# Patient Record
Sex: FEMALE | Race: WHITE | NOT HISPANIC OR LATINO | Employment: PART TIME | URBAN - NONMETROPOLITAN AREA
[De-identification: names, ages, dates, MRNs, and addresses within clinical notes are randomized per-mention and may not be internally consistent; named-entity substitution may affect disease eponyms.]

---
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
## 2022-01-05 ENCOUNTER — NURSE TRIAGE (OUTPATIENT)
Dept: PEDIATRICS | Facility: OTHER | Age: 22
End: 2022-01-05

## 2022-01-05 NOTE — TELEPHONE ENCOUNTER
Spoke with patient. Denies abdominal pain, nausea has improved slightly. Able to keep down fluids. Is voiding, denies diarrhea. She still has some Zofran ODT from her last bout of gastritis; she took one around 3 am and it helped slightly.    Most likely a 24-hour stomach bug, as that is going around. Unlikely to be an acute appendicitis as no abdominal pain. Recommend home care, with small frequent sips of fluid and Zofran as needed. Follow up if still having symptoms tomorrow.    Marimar is in agreement with the plan. Requested note to excuse from work - letter written and sent via [employer].

## 2022-01-05 NOTE — LETTER
January 5, 2022      Marimar Jarrett  [address] [facility] [address]        To Whom It May Concern:    Marimar Jarrett  was seen virtually on 1/5/22.  Please excuse her from work until symptoms improve due to illness. I expect she should be able to return to work on 1/6/22 or 1/7/22.        Sincerely,        KENDRICK Oakes CNP

## 2022-01-05 NOTE — TELEPHONE ENCOUNTER
Call from patient reporting vomiting and fever (temp 102). Patient report drinking a lot of water, feeling dizziness since 1/4/22 around the same time vomiting started.     Patient reports 6 episodes of vomiting since 11 pm-930 am on 1/4/22. Denies fatigue, weakness or unsteady on feet.     Exposure to covid 19 on 1/4/22 to a friend testing positive for covid 19 on 1/5/22.    Patient has been fully vaccinated for covid 19. Has not received booster.     Patient is requesting appointment with Keri Kemp, offered covid testing, patient refused as she is wanting to see Keri Kemp. Patient states that work can test for covid 19.     Please reach out to patient at [phone]        Reason for Disposition    COVID-19 Home Isolation, questions about    Additional Information    Negative: SEVERE difficulty breathing (e.g., struggling for each breath, speaks in single words)    Negative: Difficult to awaken or acting confused (e.g., disoriented, slurred speech)    Negative: Bluish (or gray) lips or face now    Negative: Shock suspected (e.g., cold/pale/clammy skin, too weak to stand, low BP, rapid pulse)    Negative: Sounds like a life-threatening emergency to the triager    Negative: [1] COVID-19 exposure AND [2] no symptoms    Negative: COVID-19 vaccine reaction suspected (e.g., fever, headache, muscle aches) occurring 1 to 3 days after getting vaccine    Negative: COVID-19 vaccine, questions about    Negative: [1] Lives with someone known to have influenza (flu test positive) AND [2] flu-like symptoms (e.g., cough, runny nose, sore throat, SOB; with or without fever)    Negative: [1] Adult with possible COVID-19 symptoms AND [2] triager concerned about severity of symptoms or other causes    Negative: COVID-19 and breastfeeding, questions about    Negative: SEVERE or constant chest pain or pressure (Exception: mild central chest pain, present only when coughing)    Negative: MODERATE difficulty breathing (e.g.,  speaks in phrases, SOB even at rest, pulse 100-120)    Negative: [1] Headache AND [2] stiff neck (can't touch chin to chest)    Negative: MILD difficulty breathing (e.g., minimal/no SOB at rest, SOB with walking, pulse <100)    Negative: Chest pain or pressure    Negative: Patient sounds very sick or weak to the triager    Negative: Fever > 103 F (39.4 C)    Negative: [1] Fever > 101 F (38.3 C) AND [2] age > 60 years    Negative: [1] Fever > 100.0 F (37.8 C) AND [2] bedridden (e.g., nursing home patient, CVA, chronic illness, recovering from surgery)    Negative: HIGH RISK for severe COVID complications (e.g., age > 64 years, obesity with BMI > 25, pregnant, chronic lung disease or other chronic medical condition)  (Exception: Already seen by PCP and no new or worsening symptoms.)    Negative: [1] HIGH RISK patient AND [2] influenza is widespread in the community AND [3] ONE OR MORE respiratory symptoms: cough, sore throat, runny or stuffy nose    Negative: [1] HIGH RISK patient AND [2] influenza exposure within the last 7 days AND [3] ONE OR MORE respiratory symptoms: cough, sore throat, runny or stuffy nose    Negative: [1] COVID-19 infection suspected by caller or triager AND [2] mild symptoms (cough, fever, or others) AND [3] negative COVID-19 rapid test    Negative: Fever present > 3 days (72 hours)    Negative: [1] Fever returns after gone for over 24 hours AND [2] symptoms worse or not improved    Negative: [1] Continuous (nonstop) coughing interferes with work or school AND [2] no improvement using cough treatment per protocol    Negative: Cough present > 3 weeks    Negative: [1] COVID-19 diagnosed by positive lab test AND [2] NO symptoms (e.g., cough, fever, others)    Negative: [1] COVID-19 diagnosed by positive lab test AND [2] mild symptoms (e.g., cough, fever, others) AND [3] no complications or SOB    Negative: [1] COVID-19 diagnosed by doctor (or NP/PA) AND [2] mild symptoms (e.g., cough, fever,  "others) AND [3] no complications or SOB    Negative: [1] COVID-19 diagnosed AND [2] has mild nausea, vomiting or diarrhea    Answer Assessment - Initial Assessment Questions  1. COVID-19 DIAGNOSIS: \"Who made your Coronavirus (COVID-19) diagnosis?\" \"Was it confirmed by a positive lab test?\" If not diagnosed by a HCP, ask \"Are there lots of cases (community spread) where you live?\" (See public health department website, if unsure)      No     2. COVID-19 EXPOSURE: \"Was there any known exposure to COVID before the symptoms began?\" [facility] Definition of close contact: within 6 feet (2 meters) for a total of 15 minutes or more over a 24-hour period.       Yes, exposure to a friend on 1/4/22 who tested positive on 1/5/22    3. ONSET: \"When did the COVID-19 symptoms start?\"       1/4/22 around 11 pm     4. WORST SYMPTOM: \"What is your worst symptom?\" (e.g., cough, fever, shortness of breath, muscle aches)      Vomiting     5. COUGH: \"Do you have a cough?\" If Yes, ask: \"How bad is the cough?\"        No     6. FEVER: \"Do you have a fever?\" If Yes, ask: \"What is your temperature, how was it measured, and when did it start?\"      Yes temp 102    7. RESPIRATORY STATUS: \"Describe your breathing?\" (e.g., shortness of breath, wheezing, unable to speak)       No     8. BETTER-SAME-WORSE: \"Are you getting better, staying the same or getting worse compared to yesterday?\"  If getting worse, ask, \"In what way?\"      Better    9. HIGH RISK DISEASE: \"Do you have any chronic medical problems?\" (e.g., asthma, heart or lung disease, weak immune system, obesity, etc.)      No     10. PREGNANCY: \"Is there any chance you are pregnant?\" \"When was your last menstrual period?\"        No     11. OTHER SYMPTOMS: \"Do you have any other symptoms?\"  (e.g., chills, fatigue, headache, loss of smell or taste, muscle pain, sore throat; new loss of smell or taste especially support the diagnosis of COVID-19)        Vomiting, fever and dizziness    Protocols " used: CORONAVIRUS (COVID-19) DIAGNOSED OR [id number]-BV 8.25.2021

## 2022-01-23 ENCOUNTER — HEALTH MAINTENANCE LETTER (OUTPATIENT)
Age: 22
End: 2022-01-23

## 2022-01-26 ENCOUNTER — OFFICE VISIT (OUTPATIENT)
Dept: PEDIATRICS | Facility: OTHER | Age: 22
End: 2022-01-26
Attending: NURSE PRACTITIONER
Payer: COMMERCIAL

## 2022-01-26 ENCOUNTER — MYC MEDICAL ADVICE (OUTPATIENT)
Dept: PEDIATRICS | Facility: OTHER | Age: 22
End: 2022-01-26

## 2022-01-26 VITALS
OXYGEN SATURATION: 99 % | BODY MASS INDEX: 23.23 KG/M2 | DIASTOLIC BLOOD PRESSURE: 68 MMHG | HEART RATE: 82 BPM | SYSTOLIC BLOOD PRESSURE: 102 MMHG | RESPIRATION RATE: 18 BRPM | TEMPERATURE: 97 F | WEIGHT: 115 LBS

## 2022-01-26 DIAGNOSIS — S06.0X9A CONCUSSION WITH LOSS OF CONSCIOUSNESS, INITIAL ENCOUNTER: ICD-10-CM

## 2022-01-26 DIAGNOSIS — R55 SYNCOPE, UNSPECIFIED SYNCOPE TYPE: Primary | ICD-10-CM

## 2022-01-26 LAB — HCG UR QL: NEGATIVE

## 2022-01-26 PROCEDURE — 99214 OFFICE O/P EST MOD 30 MIN: CPT | Performed by: NURSE PRACTITIONER

## 2022-01-26 PROCEDURE — G0463 HOSPITAL OUTPT CLINIC VISIT: HCPCS

## 2022-01-26 PROCEDURE — 81025 URINE PREGNANCY TEST: CPT | Mod: ZL | Performed by: NURSE PRACTITIONER

## 2022-01-26 ASSESSMENT — PAIN SCALES - GENERAL: PAINLEVEL: SEVERE PAIN (7)

## 2022-01-26 NOTE — NURSING NOTE
"Chief Complaint   Patient presents with     Fall       Initial /68   Pulse 82   Temp 97  F (36.1  C)   Resp 18   Wt 52.2 kg (115 lb)   LMP 01/09/2022 (Approximate)   SpO2 99%   BMI 23.23 kg/m   Estimated body mass index is 23.23 kg/m  as calculated from the following:    Height as of 10/8/21: 1.499 m (4' 11\").    Weight as of this encounter: 52.2 kg (115 lb).  Medication Reconciliation: lavon Garcia  "

## 2022-01-26 NOTE — LETTER
January 26, 2022      Marimar Jarrett  [address] [place] [address]        To Whom It May Concern:    Marimar Jarrett  was seen on 1/26/22.  Please excuse her from work until Monday 1/31/22 due to injury.        Sincerely,        KENDRICK Oakes CNP

## 2022-01-26 NOTE — PROGRESS NOTES
"  Assessment & Plan     Syncope, unspecified syncope type  Symptoms are typical of vasovagal syncope. Urine pregnancy test is negative - may restart oral contraceptives today.     Recommend adequate fluid intake with a goal of 2-3 L daily. Eat foods with adequate salt to maintain blood pressure. Change positions slowly. Sit down with head lower than heart if having a future episode of flushing or tunnel vision.  - HCG qualitative urine    Concussion with loss of consciousness, initial encounter  Neuro exam is normal. LOC due to syncope. Recommend staying home from work tomorrow, and return to normal activity over the next few days. Avoid activity that has a risk of further head injury. Use ice or cool washcloth to forehead for comfort. May take acetaminophen or ibuprofen if helpful, although ibuprofen may increase any bruising that may develop.    Headache should be improving over the next few days. Follow up in about 1 week to follow up concussion. Return sooner if worsening symptoms.        35 minutes spent on the date of the encounter doing chart review, history and exam, documentation and further activities per the note           Return in about 1 week (around 2/2/2022).    KENDRICK Oakes [facility] - ROCIO Minaya is a 22 year old who presents for the following health issues     HPI     Concern - Headache - fell this morning at 0445. Was getting up, getting ready for work, about to brush her teeth, when she felt hot and then her vision went dark and she fell. She did have LOC, but unsure how long. Possibly hit her head when she fell, but uncertain.  Was fully aware right after. No loss of bowel or bladder. Boyfriend found her lying on her right side  -Patient discontinued all medications \"about a month ago\" (oral contraceptives, LEXAPRO, VALTREX. She is unsure why she stopped taking it.  -Patient states that she last ate at 1900 the night prior, no personal or " family Hx of loss of consciousness. She has been eating and drinking today. Denies nausea.  - Sister had a seizure when she was 25 years old       Onset: 1/26/2021 0445     Description:   Frontal lobe headache 7/10    Intensity: moderate, 7/10    Progression of Symptoms:  same    Accompanying Signs & Symptoms:  None    Previous history of similar problem:   None    Precipitating factors:   Worsened by: None    Alleviating factors:  Improved by: None    Therapies Tried and outcome: Tylenol - ineffective     H/A is her only current symptom.      LMP 1/9/22, which was normal, but short (3-4 days). Stopped oral contraceptives shortly after Christmas 2021.      Review of Systems   Constitutional, HEENT, cardiovascular, pulmonary, gi and gu systems are negative, except as otherwise noted.      Objective    /68   Pulse 82   Temp 97  F (36.1  C)   Resp 18   Wt 52.2 kg (115 lb)   LMP 01/09/2022 (Approximate)   SpO2 99%   BMI 23.23 kg/m    Body mass index is 23.23 kg/m .  Physical Exam   GENERAL: healthy, alert and no distress  EYES: Eyes grossly normal to inspection, PERRL and conjunctivae and sclerae normal  HENT: ear canals and TM's normal, nose and mouth without ulcers or lesions. Forehead with   NECK: no adenopathy, no asymmetry, masses, or scars and thyroid normal to palpation  RESP: lungs clear to auscultation - no rales, rhonchi or wheezes  CV: regular rate and rhythm, normal S1 S2, no S3 or S4, no murmur, click or rub, no peripheral edema and peripheral pulses strong  MS: no gross musculoskeletal defects noted, no edema  SKIN: no suspicious lesions or rashes  NEURO: Normal strength and tone, mentation intact and speech normal  PSYCH: mentation appears normal, affect slightly flat    Results for orders placed or performed in visit on 01/26/22 (from the past 24 hour(s))   HCG qualitative urine   Result Value Ref Range    hCG Urine Qualitative Negative Negative

## 2022-02-15 NOTE — PROGRESS NOTES
Assessment & Plan     Other depression  Discussed restarting Lexapro (as it had not fully taken effect prior to stopping) vs starting an alternate therapy. Marimar elects to try Lexapro again, which is reasonable. Will take 1/2 tablet for the first week, then increase to a full tablet daily. Follow up in 3 weeks.  - escitalopram (LEXAPRO) 20 MG tablet; Take 1 tablet (20 mg) by mouth daily    Anxiety    - escitalopram (LEXAPRO) 20 MG tablet; Take 1 tablet (20 mg) by mouth daily    Concussion with loss of consciousness, subsequent encounter  Continued headaches, but denies other symptoms. Difficult to tell whether headaches are caused by stress or residual from concussion. Will recheck at follow up visit.    Recurrent cold sores  Refilled valacyclovir  - valACYclovir (VALTREX) 500 MG tablet; Take 1 tablet (500 mg) by mouth daily    Encounter for surveillance of contraceptive pills  Urine pregnancy negative today. May restart oral contraceptives.  - HCG qualitative urine      34 minutes spent on the date of the encounter doing chart review, history and exam, documentation and further activities per the note           Return in about 3 weeks (around 3/14/2022) for Medication follow up.    KENDRICK Oakes [facility] - HIBANN MARIE    Subjective   Marimar is a 22 year old who presents for the following health issues     HPI     Depression and Anxiety Follow-Up    How are you doing with your depression since your last visit? No change    How are you doing with your anxiety since your last visit?  No change    Are you having other symptoms that might be associated with depression or anxiety? Yes:  see phq, sam    Have you had a significant life event? No  - concussion 1/26/22 after a syncopal episode. No further syncope    Do you have any concerns with your use of alcohol or other drugs? No     Stopped Lexapro 2 months ago, about 3 weeks after dose was increased to 20 mg daily. Unsure why she  stopped.    Will be starting a new job at [employer] in [place] next week - is excited to start.     Still having occasional headaches after concussion. The other day, took 1000 mg of Tylenol without relief. Going to bed helps. She was not getting headaches prior. 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    Wondering about trying a different depression/anxiety med. She did notice the Lexapro helped with some of her fear, but didn't feel it was overall helpful.    Is not in therapy. She had previously seen someone at [facility], but stopped going sometime last fall. She is interested in trying therapy somewhere else.    She has not restarted her birth control, as it wasn't ready at the pharmacy when she went to pick it up. No periods for the last 3 months or so.    Lives with her boyfriend. States it's going well most of the time.    Exercise helps her symptoms of depression/anxiety.    No caffeine use      Social History     Tobacco Use     Smoking status: Never Smoker     Smokeless tobacco: Never Used   Vaping Use     Vaping Use: Never used   Substance Use Topics     Alcohol use: No     Drug use: No     PHQ 9/13/2021 10/8/2021 2/21/2022   PHQ-9 Total Score 15 22 24   Q9: Thoughts of better off dead/self-harm past 2 weeks Not at all Not at all Not at all     ANAYA-7 SCORE 9/13/2021 10/8/2021 2/21/2022   Total Score 20 21 21     Last PHQ-9 2/21/2022   1.  Little interest or pleasure in doing things 3   2.  Feeling down, depressed, or hopeless 3   3.  Trouble falling or staying asleep, or sleeping too much 3   4.  Feeling tired or having little energy 3   5.  Poor appetite or overeating 3   6.  Feeling bad about yourself 3   7.  Trouble concentrating 3   8.  Moving slowly or restless 3   Q9: Thoughts of better off dead/self-harm past 2 weeks 0   PHQ-9 Total Score 24   Difficulty at work, home, or with people Not difficult at all      ANAYA-7  2/21/2022   1. Feeling nervous, anxious, or on edge 3   2. Not being able to stop or control worrying 3   3. Worrying too much about different things 3   4. Trouble relaxing 3   5. Being so restless that it is hard to sit still 3   6. Becoming easily annoyed or irritable 3   7. Feeling afraid, as if something awful might happen 3   ANAYA-7 Total Score 21   If you checked any problems, how difficult have they made it for you to do your work, take care of things at home, or get along with other people? Not difficult at all       Suicide Assessment Five-step Evaluation and Treatment (SAFE-T)          Review of Systems   Constitutional, HEENT, cardiovascular, pulmonary, gi and gu systems are negative, except as otherwise noted.      Objective    /62 (BP Location: Right arm, Patient Position: Chair, Cuff Size: Adult Regular)   Pulse 78   Temp 96.8  F (36  C) (Tympanic)   Resp 20   Ht 1.524 m (5')   Wt 49.9 kg (110 lb)   SpO2 100%   BMI 21.48 kg/m    Body mass index is 21.48 kg/m .  Physical Exam   GENERAL: healthy, alert and no distress  RESP: lungs clear to auscultation - no rales, rhonchi or wheezes  CV: regular rate and rhythm, normal S1 S2, no S3 or S4, no murmur, click or rub, no peripheral edema and peripheral pulses strong      DIAGNOSTICS:  Results for orders placed or performed in visit on 02/21/22   HCG qualitative urine     Status: Normal   Result Value Ref Range    hCG Urine Qualitative Negative Negative

## 2022-02-21 ENCOUNTER — OFFICE VISIT (OUTPATIENT)
Dept: PEDIATRICS | Facility: OTHER | Age: 22
End: 2022-02-21
Attending: NURSE PRACTITIONER
Payer: COMMERCIAL

## 2022-02-21 VITALS
WEIGHT: 110 LBS | HEIGHT: 60 IN | DIASTOLIC BLOOD PRESSURE: 62 MMHG | OXYGEN SATURATION: 100 % | BODY MASS INDEX: 21.6 KG/M2 | HEART RATE: 78 BPM | TEMPERATURE: 96.8 F | SYSTOLIC BLOOD PRESSURE: 106 MMHG | RESPIRATION RATE: 20 BRPM

## 2022-02-21 DIAGNOSIS — B00.1 RECURRENT COLD SORES: ICD-10-CM

## 2022-02-21 DIAGNOSIS — F41.9 ANXIETY: ICD-10-CM

## 2022-02-21 DIAGNOSIS — F32.89 OTHER DEPRESSION: Primary | ICD-10-CM

## 2022-02-21 DIAGNOSIS — Z30.41 ENCOUNTER FOR SURVEILLANCE OF CONTRACEPTIVE PILLS: ICD-10-CM

## 2022-02-21 DIAGNOSIS — S06.0X9D CONCUSSION WITH LOSS OF CONSCIOUSNESS, SUBSEQUENT ENCOUNTER: ICD-10-CM

## 2022-02-21 LAB — HCG UR QL: NEGATIVE

## 2022-02-21 PROCEDURE — 99214 OFFICE O/P EST MOD 30 MIN: CPT | Performed by: NURSE PRACTITIONER

## 2022-02-21 PROCEDURE — G0463 HOSPITAL OUTPT CLINIC VISIT: HCPCS

## 2022-02-21 PROCEDURE — 81025 URINE PREGNANCY TEST: CPT | Mod: ZL | Performed by: NURSE PRACTITIONER

## 2022-02-21 RX ORDER — VALACYCLOVIR HYDROCHLORIDE 500 MG/1
500 TABLET, FILM COATED ORAL DAILY
Qty: 30 TABLET | Refills: 11 | Status: SHIPPED | OUTPATIENT
Start: 2022-02-21 | End: 2022-06-29

## 2022-02-21 RX ORDER — ESCITALOPRAM OXALATE 20 MG/1
20 TABLET ORAL DAILY
Qty: 30 TABLET | Refills: 0 | Status: SHIPPED | OUTPATIENT
Start: 2022-02-21 | End: 2022-05-03

## 2022-02-21 ASSESSMENT — PATIENT HEALTH QUESTIONNAIRE - PHQ9: SUM OF ALL RESPONSES TO PHQ QUESTIONS 1-9: 24

## 2022-02-21 ASSESSMENT — ANXIETY QUESTIONNAIRES
GAD7 TOTAL SCORE: 21
7. FEELING AFRAID AS IF SOMETHING AWFUL MIGHT HAPPEN: NEARLY EVERY DAY
6. BECOMING EASILY ANNOYED OR IRRITABLE: NEARLY EVERY DAY
1. FEELING NERVOUS, ANXIOUS, OR ON EDGE: NEARLY EVERY DAY
4. TROUBLE RELAXING: NEARLY EVERY DAY
IF YOU CHECKED OFF ANY PROBLEMS ON THIS QUESTIONNAIRE, HOW DIFFICULT HAVE THESE PROBLEMS MADE IT FOR YOU TO DO YOUR WORK, TAKE CARE OF THINGS AT HOME, OR GET ALONG WITH OTHER PEOPLE: NOT DIFFICULT AT ALL
2. NOT BEING ABLE TO STOP OR CONTROL WORRYING: NEARLY EVERY DAY
3. WORRYING TOO MUCH ABOUT DIFFERENT THINGS: NEARLY EVERY DAY
5. BEING SO RESTLESS THAT IT IS HARD TO SIT STILL: NEARLY EVERY DAY

## 2022-02-21 ASSESSMENT — PAIN SCALES - GENERAL: PAINLEVEL: NO PAIN (0)

## 2022-02-21 NOTE — NURSING NOTE
Chief Complaint   Patient presents with     Depression     Anxiety       Initial /62 (BP Location: Right arm, Patient Position: Chair, Cuff Size: Adult Regular)   Pulse 78   Temp 96.8  F (36  C) (Tympanic)   Resp 20   Ht 1.524 m (5')   Wt 49.9 kg (110 lb)   SpO2 100%   BMI 21.48 kg/m   Estimated body mass index is 21.48 kg/m  as calculated from the following:    Height as of this encounter: 1.524 m (5').    Weight as of this encounter: 49.9 kg (110 lb).  Medication Reconciliation: complete  Mary Anne LPN

## 2022-02-21 NOTE — PATIENT INSTRUCTIONS
Psychologists/ Counselors      Jewel Garcia   [phone]  [facility]   [phone]  [facility] [phone]  [facility] (kids) [phone]  [facility](teens) [phone]  [facility]  [phone]  [facility]  [phone]  [facility]  [phone]  [facility]  [phone]  [facility] counseling [phone]   [facility]   [phone]   [facility]    [phone]   [facility] [place]   [phone]   (Randi Villatoro)  [facility]                          [phone]  (IZAIAH Rodriguez)                                                      [facility]                      [phone]           [facility]  [phone]  [facility] [phone]  [facility]  [phone]  [facility]  [phone]     [facility] [phone]      [facility] [phone]  [facility] counseling [phone]  [facility]   [phone]  [facility] (IZAIAH Rodriguez)                                                   [phone]  [facility]  [phone]  [facility] [phone]  [facility]      [phone]    [facility]  [phone]  [facility]      [phone]     [facility]   [phone]  [facility]      [phone]  [facility] [phone]  [facility]      [phone]  [facility]  [phone]    [facility]                      [phone]    *Facilities in bold italics indicate medication management  Services are offered.    Crisis support  Mobile crisis line- [phone]  [facility]: Free online resources including therapy for Mental Health and substance use problems: Http://thriverange.org    Crisis Text Line  Text HOME to 577-971 - The Crisis  Text Line serves anyone, in any type of crisis, providing access to free, 24/7 support and information via the medium people already use and trust  http://www.crisistextline.org   Here's how it works:  Text HOME to 225-070 from anywhere in the [place], anytime, about any type of crisis.  A live, trained Crisis Counselor receives the text and responds quickly.  The volunteer Crisis Counselor will help you move from a 'hot moment to a cool moment'

## 2022-02-22 ASSESSMENT — ANXIETY QUESTIONNAIRES: GAD7 TOTAL SCORE: 21

## 2022-05-03 ENCOUNTER — ALLIED HEALTH/NURSE VISIT (OUTPATIENT)
Dept: ALLERGY | Facility: OTHER | Age: 22
End: 2022-05-03
Attending: NURSE PRACTITIONER
Payer: COMMERCIAL

## 2022-05-03 DIAGNOSIS — Z11.1 SCREENING EXAMINATION FOR PULMONARY TUBERCULOSIS: Primary | ICD-10-CM

## 2022-05-03 PROCEDURE — 86580 TB INTRADERMAL TEST: CPT

## 2022-05-03 NOTE — PROGRESS NOTES
Mantoux administered intradermally left Fore Arm.  Pt advised to RTC in 48-72 hrs to have it read.    Shun Goss RN on 5/3/2022 at 1:09 PM

## 2022-06-14 NOTE — PROGRESS NOTES
Assessment & Plan     Anxiety  Marimar has been taking Lexapro consistently for about 3-4 weeks. Will continue and recheck in about 3 weeks. Recommend a psychiatric medication provider, as symptoms are more complex than simple depression/anxiety and are concerning for potential bipolar, ADHD, or other complicating factor. Marimar is agreeable to the idea. Referral sent to [facility] in [place]. Recommended contacting them this week to start the intake process.  - CBC with platelets  - Ferritin  - TSH with free T4 reflex  - Vitamin D Deficiency  - escitalopram (LEXAPRO) 20 MG tablet; Take 1 tablet (20 mg) by mouth daily  - Adult Mental Health  Referral; Future    Other depression    - CBC with platelets  - Ferritin  - TSH with free T4 reflex  - Vitamin D Deficiency  - escitalopram (LEXAPRO) 20 MG tablet; Take 1 tablet (20 mg) by mouth daily  - Adult Mental Health  Referral; Future    Hair loss  TSH is normal, but ferritin is quite low. Start iron, and will recheck at follow up visit. Await vitamin D testing.  - CBC with platelets  - Ferritin  - TSH with free T4 reflex  - Vitamin D Deficiency    Low serum ferritin level    - ferrous sulfate (FEROSUL) 325 (65 Fe) MG tablet; Take 1 tablet (325 mg) by mouth daily (with breakfast) Avoid taking with dairy      32 minutes spent on the date of the encounter doing chart review, history and exam, documentation and further activities per the note           Return in about 22 days (around 7/7/2022) for Medication follow up.    KENDRICK Oakes [facility]   Marimar is a 22 year old who presents for the following health issues     HPI     Depression and Anxiety Follow-Up    How are you doing with your depression since your last visit? No change    How are you doing with your anxiety since your last visit?  No change- states before 10 now 8- doesn't feel as if someone is watching her all the time     Are you  having other symptoms that might be associated with depression or anxiety? No    Have you had a significant life event? No     Do you have any concerns with your use of alcohol or other drugs? No     Difficulty taking medication (has had 60 doses in the last 4 months - 120 days)? Started taking the medication regularly about a month ago (when last prescribed).     Not currently in counseling, but has been talking to her mom a lot lately, which has been helpful.    Feels that her mind is constantly racing. Has talked with her mom about the possibility of ADHD and/or bipolar symptoms. Short-tempered. She has had periods during the year (mainly in the winter) when she didn't want to leave her room and spent the majority of her day on her phone.    Has been losing hair more than normal for the past 3 months. States her  has even noticed the hair loss. Not due to breakage. Unable to sleep well, dreams have been very vivid lately. Difficult to fall asleep, difficult to stay asleep at night. No skin changes. No big changes in appetite. Feeling less motivated, more sluggish during the day. Difficulty completing her normal chores. No changes in bowel habits.    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    Eating a varied diet, rare caffeine. Exercising regularly. Ran 12 miles this past weekend.     Social History     Tobacco Use     Smoking status: Never Smoker     Smokeless tobacco: Never Used   Vaping Use     Vaping Use: Never used   Substance Use Topics     Alcohol use: No     Drug use: No     PHQ 10/8/2021 2/21/2022 6/15/2022   PHQ-9 Total Score 22 24 24   Q9: Thoughts of better off dead/self-harm past 2 weeks Not at all Not at all Not at all     ANAYA-7 SCORE 10/8/2021 2/21/2022 6/15/2022   Total Score 21 21 21     Last PHQ-9 6/15/2022   1.  Little interest or pleasure in doing things 3   2.  Feeling down, depressed, or hopeless 3   3.  Trouble falling or staying asleep, or sleeping too  much 3   4.  Feeling tired or having little energy 3   5.  Poor appetite or overeating 3   6.  Feeling bad about yourself 3   7.  Trouble concentrating 3   8.  Moving slowly or restless 3   Q9: Thoughts of better off dead/self-harm past 2 weeks 0   PHQ-9 Total Score 24   Difficulty at work, home, or with people Not difficult at all     ANAYA-7  6/15/2022   1. Feeling nervous, anxious, or on edge 3   2. Not being able to stop or control worrying 3   3. Worrying too much about different things 3   4. Trouble relaxing 3   5. Being so restless that it is hard to sit still 3   6. Becoming easily annoyed or irritable 3   7. Feeling afraid, as if something awful might happen 3   ANAYA-7 Total Score 21   If you checked any problems, how difficult have they made it for you to do your work, take care of things at home, or get along with other people? Not difficult at all     Review of Systems   Constitutional, HEENT, cardiovascular, pulmonary, gi and gu systems are negative, except as otherwise noted.      Objective    BP 96/60 (BP Location: Left arm, Patient Position: Chair, Cuff Size: Adult Regular)   Pulse 76   Temp 98  F (36.7  C) (Tympanic)   Resp 16   Wt 49.9 kg (110 lb)   SpO2 99%   BMI 21.48 kg/m    Body mass index is 21.48 kg/m .  Physical Exam   GENERAL: healthy, alert and no distress  RESP: lungs clear to auscultation - no rales, rhonchi or wheezes  CV: regular rate and rhythm, normal S1 S2, no S3 or S4, no murmur, click or rub, no peripheral edema and peripheral pulses strong  PSYCH: mentation appears normal, affect normal/bright    Results for orders placed or performed in visit on 06/15/22 (from the past 24 hour(s))   CBC with platelets   Result Value Ref Range    WBC Count 4.6 4.0 - 11.0 10e3/uL    RBC Count 4.41 3.80 - 5.20 10e6/uL    Hemoglobin 13.3 11.7 - 15.7 g/dL    Hematocrit 40.1 35.0 - 47.0 %    MCV 91 78 - 100 fL    MCH 30.2 26.5 - 33.0 pg    MCHC 33.2 31.5 - 36.5 g/dL    RDW 13.6 10.0 - 15.0 %     Platelet Count 284 150 - 450 10e3/uL   Ferritin   Result Value Ref Range    Ferritin 11 (L) 12 - 150 ng/mL   TSH with free T4 reflex   Result Value Ref Range    TSH 0.88 0.40 - 4.00 mU/L       Vitamin D level pending

## 2022-06-15 ENCOUNTER — MYC MEDICAL ADVICE (OUTPATIENT)
Dept: PEDIATRICS | Facility: OTHER | Age: 22
End: 2022-06-15

## 2022-06-15 ENCOUNTER — OFFICE VISIT (OUTPATIENT)
Dept: PEDIATRICS | Facility: OTHER | Age: 22
End: 2022-06-15
Attending: NURSE PRACTITIONER
Payer: COMMERCIAL

## 2022-06-15 VITALS
RESPIRATION RATE: 16 BRPM | BODY MASS INDEX: 21.48 KG/M2 | OXYGEN SATURATION: 99 % | TEMPERATURE: 98 F | WEIGHT: 110 LBS | DIASTOLIC BLOOD PRESSURE: 60 MMHG | HEART RATE: 76 BPM | SYSTOLIC BLOOD PRESSURE: 96 MMHG

## 2022-06-15 DIAGNOSIS — F32.89 OTHER DEPRESSION: ICD-10-CM

## 2022-06-15 DIAGNOSIS — R79.0 LOW SERUM FERRITIN LEVEL: ICD-10-CM

## 2022-06-15 DIAGNOSIS — F41.9 ANXIETY: Primary | ICD-10-CM

## 2022-06-15 DIAGNOSIS — L65.9 HAIR LOSS: ICD-10-CM

## 2022-06-15 LAB
ERYTHROCYTE [DISTWIDTH] IN BLOOD BY AUTOMATED COUNT: 13.6 % (ref 10–15)
FERRITIN SERPL-MCNC: 11 NG/ML (ref 12–150)
HCT VFR BLD AUTO: 40.1 % (ref 35–47)
HGB BLD-MCNC: 13.3 G/DL (ref 11.7–15.7)
MCH RBC QN AUTO: 30.2 PG (ref 26.5–33)
MCHC RBC AUTO-ENTMCNC: 33.2 G/DL (ref 31.5–36.5)
MCV RBC AUTO: 91 FL (ref 78–100)
PLATELET # BLD AUTO: 284 10E3/UL (ref 150–450)
RBC # BLD AUTO: 4.41 10E6/UL (ref 3.8–5.2)
TSH SERPL DL<=0.005 MIU/L-ACNC: 0.88 MU/L (ref 0.4–4)
WBC # BLD AUTO: 4.6 10E3/UL (ref 4–11)

## 2022-06-15 PROCEDURE — 99214 OFFICE O/P EST MOD 30 MIN: CPT | Performed by: NURSE PRACTITIONER

## 2022-06-15 PROCEDURE — 82728 ASSAY OF FERRITIN: CPT | Mod: ZL | Performed by: NURSE PRACTITIONER

## 2022-06-15 PROCEDURE — 82306 VITAMIN D 25 HYDROXY: CPT | Mod: ZL | Performed by: NURSE PRACTITIONER

## 2022-06-15 PROCEDURE — G0463 HOSPITAL OUTPT CLINIC VISIT: HCPCS

## 2022-06-15 PROCEDURE — 84443 ASSAY THYROID STIM HORMONE: CPT | Mod: ZL | Performed by: NURSE PRACTITIONER

## 2022-06-15 PROCEDURE — 85027 COMPLETE CBC AUTOMATED: CPT | Mod: ZL | Performed by: NURSE PRACTITIONER

## 2022-06-15 PROCEDURE — 36415 COLL VENOUS BLD VENIPUNCTURE: CPT | Mod: ZL | Performed by: NURSE PRACTITIONER

## 2022-06-15 RX ORDER — ESCITALOPRAM OXALATE 20 MG/1
20 TABLET ORAL DAILY
Qty: 30 TABLET | Refills: 0 | Status: SHIPPED | OUTPATIENT
Start: 2022-06-15 | End: 2022-07-21

## 2022-06-15 RX ORDER — FERROUS SULFATE 325(65) MG
325 TABLET ORAL
Qty: 30 TABLET | Refills: 1 | Status: SHIPPED | OUTPATIENT
Start: 2022-06-15 | End: 2022-08-25

## 2022-06-15 ASSESSMENT — ANXIETY QUESTIONNAIRES
4. TROUBLE RELAXING: NEARLY EVERY DAY
1. FEELING NERVOUS, ANXIOUS, OR ON EDGE: NEARLY EVERY DAY
IF YOU CHECKED OFF ANY PROBLEMS ON THIS QUESTIONNAIRE, HOW DIFFICULT HAVE THESE PROBLEMS MADE IT FOR YOU TO DO YOUR WORK, TAKE CARE OF THINGS AT HOME, OR GET ALONG WITH OTHER PEOPLE: NOT DIFFICULT AT ALL
2. NOT BEING ABLE TO STOP OR CONTROL WORRYING: NEARLY EVERY DAY
GAD7 TOTAL SCORE: 21
5. BEING SO RESTLESS THAT IT IS HARD TO SIT STILL: NEARLY EVERY DAY
3. WORRYING TOO MUCH ABOUT DIFFERENT THINGS: NEARLY EVERY DAY
GAD7 TOTAL SCORE: 21
6. BECOMING EASILY ANNOYED OR IRRITABLE: NEARLY EVERY DAY
7. FEELING AFRAID AS IF SOMETHING AWFUL MIGHT HAPPEN: NEARLY EVERY DAY

## 2022-06-15 ASSESSMENT — PATIENT HEALTH QUESTIONNAIRE - PHQ9: SUM OF ALL RESPONSES TO PHQ QUESTIONS 1-9: 24

## 2022-06-15 ASSESSMENT — PAIN SCALES - GENERAL: PAINLEVEL: NO PAIN (0)

## 2022-06-15 NOTE — PATIENT INSTRUCTIONS
Psychologists/ Counselors      Jewel Garcia   [phone]  [facility]                      [phone]  [facility]   [phone]  [facility]  [phone]  [facility] (kids) [phone]  [facility](teens) [phone]  [facility]  [phone]  [facility]  [phone]  [facility]  [phone]  [facility]                                  [phone]  [facility]  [phone]  [facility] [phone]   [facility]   [phone]   [facility]    [phone]   [facility]   [phone]   (Randi Villatoro)  [facility]                          [phone]  (IZAIAH Rodriguez)                                                      [facility]                      [phone]           [facility]  [phone]  [facility] [phone]  [facility]  [phone]  [facility]  [phone]     [facility] [phone]      [facility] [phone]  [facility] counseling [phone]  [facility]   [phone]  [facility] (IZAIAH Rodriguez)                                                   [phone]  Betty Currie  [phone]  [facility] [phone]  [facility]      [phone]    [facility]  [phone]  [facility]      [phone]     Carmen Soto   [phone]  [facility]      [phone]  [facility] Dr. REINIER Yu [phone]  [facility]      [phone]  [facility]  [phone]    [facility]                      [phone]    *Facilities in bold italics indicate medication management  Services are offered.    Crisis support  Mobile crisis line- [phone]  [facility] Range: Free online resources including therapy for Mental Health  and substance use problems: Http://thriverange.org    Crisis Text Line  Text HOME to 545-276 - The Crisis Text Line serves anyone, in any type of crisis, providing access to free, 24/7 support and information via the medium people already use and trust  http://www.crisistextline.org   Here's how it works:  Text HOME to 372-691 from anywhere in the [place], anytime, about any type of crisis.  A live, trained Crisis Counselor receives the text and responds quickly.  The volunteer Crisis Counselor will help you move from a 'hot moment to a cool moment'

## 2022-06-15 NOTE — COMMUNITY RESOURCES LIST (ENGLISH)
06/15/2022   [facility]  Mary Anne  For questions about this resource list or additional care needs, please contact your primary care clinic or care manager.  Phone: [phone]   Email: N/A   Address: [address] [place] [id number]   Hours: N/A        Mental Health       Individual counseling  1  [facility] [place] Distance: 2.34 miles      COVID-19 Status: Regular Operations   [address] [place] [address]  Language: English  Hours: Mon - Thu 9:00 AM - 8:00 PM , Fri - Sat 9:00 AM - 5:00 PM Appt. Only  Fees: Insurance, Self Pay   Phone: [phone] Ext.1 Website: [email or web address].[email or web address]/     2  [facility]. - [place] Distance: 2.51 miles      COVID-19 Status: Regular Operations, COVID-19 Status: Phone/Virtual   [address] [place] [address]  Language: English  Hours: Mon - Thu 8:00 AM - 5:00 PM  Fees: Insurance, Self Pay, Sliding Fee   Phone: [phone]) [phone] Website: [email or web address]/          Important Numbers & Websites       Emergency Services   911  City Services   311  Poison Control   (134) 931-2264  Suicide Prevention Lifeline   (261) 146-4317 (TALK)  Child Abuse Hotline   (479) 854-8118 (4-A-Child)  Sexual Assault Hotline   (497) 896-2111 (HOPE)  National Runaway Safeline   (456) 205-7961 (RUNAWAY)  All-Options Talkline   (976) 986-5765  Substance Abuse Referral   (606) 488-2817 (HELP)

## 2022-06-15 NOTE — NURSING NOTE
Chief Complaint   Patient presents with     Anxiety       Initial BP 96/60 (BP Location: Left arm, Patient Position: Chair, Cuff Size: Adult Regular)   Pulse 76   Temp 98  F (36.7  C) (Tympanic)   Resp 16   Wt 49.9 kg (110 lb)   SpO2 99%   BMI 21.48 kg/m   Estimated body mass index is 21.48 kg/m  as calculated from the following:    Height as of 2/21/22: 1.524 m (5').    Weight as of this encounter: 49.9 kg (110 lb).  Medication Reconciliation: complete  Mary Anne LPN

## 2022-06-16 LAB — DEPRECATED CALCIDIOL+CALCIFEROL SERPL-MC: 38 UG/L (ref 20–75)

## 2022-06-17 ENCOUNTER — APPOINTMENT (OUTPATIENT)
Dept: GENERAL RADIOLOGY | Facility: HOSPITAL | Age: 22
End: 2022-06-17
Attending: PHYSICIAN ASSISTANT
Payer: COMMERCIAL

## 2022-06-17 ENCOUNTER — HOSPITAL ENCOUNTER (EMERGENCY)
Facility: HOSPITAL | Age: 22
Discharge: HOME | End: 2022-06-17
Attending: PHYSICIAN ASSISTANT | Admitting: PHYSICIAN ASSISTANT
Payer: COMMERCIAL

## 2022-06-17 VITALS
TEMPERATURE: 97 F | SYSTOLIC BLOOD PRESSURE: 96 MMHG | DIASTOLIC BLOOD PRESSURE: 62 MMHG | RESPIRATION RATE: 16 BRPM | OXYGEN SATURATION: 96 % | HEART RATE: 66 BPM

## 2022-06-17 DIAGNOSIS — S99.922A FOOT INJURY, LEFT, INITIAL ENCOUNTER: ICD-10-CM

## 2022-06-17 DIAGNOSIS — S99.912A ANKLE INJURY, LEFT, INITIAL ENCOUNTER: ICD-10-CM

## 2022-06-17 PROCEDURE — 73630 X-RAY EXAM OF FOOT: CPT | Mod: LT

## 2022-06-17 PROCEDURE — G0463 HOSPITAL OUTPT CLINIC VISIT: HCPCS

## 2022-06-17 PROCEDURE — 99213 OFFICE O/P EST LOW 20 MIN: CPT | Performed by: PHYSICIAN ASSISTANT

## 2022-06-17 NOTE — LETTER
[facility] [address] [place] [address]  Main: [phone]  Dept: [phone]      June 18, 2022      Re: Marimar Jarrett      TO WHOM IT MAY CONCERN:    Marimar Jarrett was evaluated in urgent care. She has started treatment and may return to work on 6/19/22. Please excuse Marimar from missed work.         Sincerely,      SRINIVAS Giraldo, PA-C   6/18/2022   4:32 PM

## 2022-06-17 NOTE — LETTER
[place] [address] [place] [address]  Main: [phone]  Dept: [phone]      June 17, 2022      Re: Marimar Jarrett      TO WHOM IT MAY CONCERN:    Marimar Jarrett was evaluated this evening in urgent care. She has started treatment and may return to work on  19 June 2022. She will require follow up care, so please excuse her from any missed work for future appointment.         Sincerely,      SRINIVAS Giraldo, PA-C   6/17/2022   10:24 PM

## 2022-06-17 NOTE — LETTER
[facility] [facility] [address] [place] [address]  Main: [phone]  Dept: [phone]      June 18, 2022      Re: Marimar Jarrett      TO WHOM IT MAY CONCERN:    Marimar Jarrett was evaluated on 6/17/22. She has started treatment and may return to work on 6/21/22.         Sincerely,      SRINIVAS Giraldo, PA-C   6/18/2022   4:35 PM

## 2022-06-18 ASSESSMENT — ENCOUNTER SYMPTOMS
WEAKNESS: 0
CARDIOVASCULAR NEGATIVE: 1
CONSTITUTIONAL NEGATIVE: 1
NUMBNESS: 0
RESPIRATORY NEGATIVE: 1
JOINT SWELLING: 1
COLOR CHANGE: 0

## 2022-06-18 NOTE — ED TRIAGE NOTES
Pt presents with left ankle pain for past two days. Pt denies injury. There is redness on lateral  side of left foot and rates pain 6/10. Pt states it hurts more when walking. Pt has good rom.

## 2022-06-18 NOTE — DISCHARGE INSTRUCTIONS
- Rest, ice, compression, elevation  - Rotate ibuprofen and tylenol every 4-6 hrs for 2-3 days  - Try your best to stay off as much as practical. Avoid running for 5-7 days. If ANY ongoing pain, then see ortho associates.   - If you are going to tuff it out, do not wait more than 3 weeks! Swim? Bike?     [facility]: [place]  [phone] [address] [facility]  [place] [address]

## 2022-06-18 NOTE — ED TRIAGE NOTES
C/o left ankle pain rated 6/10 for 2 days. Denies any falls or injuries. Reports she has a small bump on lateral aspect of left ankle that her co-worker noticed but she said she doesn't really see. States took ibuprofen twice today. States ibuprofen did help pain some.

## 2022-06-18 NOTE — ED PROVIDER NOTES
History     Chief Complaint   Patient presents with     Ankle Pain     HPI  Marimar Jarrett is a 22 year old female who presents with left foot and ankle pain that has slowly developed over the past week.  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  Pain is described as manageable soreness at rest however gets up to at least 6 out of 10 soon as she is on her feet for any period of time.  She does note some swelling and pain that radiates just to the lateral malleolus.  She denies any locking, or giving way.  She denies any memorable injury.  She can weight-bear but pain makes it difficult.      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     Priority: Medium     Adenotonsillar hypertrophy 08/16/2013     Priority: Medium        Past Medical History:    Past Medical History:   Diagnosis Date     Streptococcal pharyngitis        Past Surgical History:    Past Surgical History:   Procedure Laterality Date     TONSILLECTOMY, ADENOIDECTOMY, COMBINED  8/22/2013    Procedure: COMBINED TONSILLECTOMY, ADENOIDECTOMY;  TONSILLECTOMY AND ADENOIDECTOMY;  Surgeon: Cherie Hodgson DO;  Location: [facility] OR     wisdom teeth  11/2021       Family History:    Family History   Problem Relation Age of Onset     Circulatory Mother      Cancer Father      Diabetes Father        Social History:  Marital Status:  Single [1]  Social History     Tobacco Use     Smoking status: Never Smoker     Smokeless tobacco: Never Used   Vaping Use     Vaping Use: Never used   Substance Use Topics     Alcohol use: No     Drug use: No        Medications:    desogestrel-ethinyl estradiol (ISIBLOOM) 0.15-30 MG-MCG tablet  escitalopram (LEXAPRO) 20 MG tablet  ferrous sulfate (FEROSUL) 325 (65 Fe) MG  tablet  valACYclovir (VALTREX) 500 MG tablet          Review of Systems   Constitutional: Negative.    Respiratory: Negative.    Cardiovascular: Negative.    Musculoskeletal: Positive for gait problem and joint swelling.   Skin: Negative for color change.   Neurological: Negative for weakness and numbness.       Physical Exam   BP: 96/62  Pulse: 66  Temp: 97  F (36.1  C)  Resp: 16  SpO2: 96 %      Physical Exam  Vitals and nursing note reviewed.   Constitutional:       General: She is not in acute distress.     Appearance: She is not toxic-appearing.   Cardiovascular:      Rate and Rhythm: Normal rate.   Pulmonary:      Effort: Pulmonary effort is normal.   Musculoskeletal:      Left ankle: No swelling. Tenderness present over the base of 5th metatarsal (Proximal from base of the fifth.). Normal range of motion.      Left Achilles Tendon: Normal.      Left foot: Tenderness present.        Feet:    Skin:     General: Skin is warm and dry.   Neurological:      Mental Status: She is alert and oriented to person, place, and time.         ED Course       Assessments & Plan (with Medical Decision Making)     I have reviewed the nursing notes.  I have reviewed the findings, diagnosis, plan and need for follow up with the patient.    Discharge Medication List as of 6/17/2022  9:48 PM          Final diagnoses:   Ankle injury, left, initial encounter   Foot injury, left, initial encounter   No obvious fracture on x-ray.  Discussed rest, ice, compression, elevation.  Consider rotation of ibuprofen and Tylenol for pain.  Regarding rest, I did recommend avoiding running for the next 5 to 7 days and reevaluate.  Because of her fitness level, I did place referral to orthopedics if conservative care does not resolve pain.  Seek attention sooner with any pain or swelling out of proportion.    6/17/2022   [facility]     Deejay Rivas PA  06/18/22 0953

## 2022-06-25 ENCOUNTER — APPOINTMENT (OUTPATIENT)
Dept: GENERAL RADIOLOGY | Facility: HOSPITAL | Age: 22
End: 2022-06-25
Attending: NURSE PRACTITIONER
Payer: COMMERCIAL

## 2022-06-25 ENCOUNTER — APPOINTMENT (OUTPATIENT)
Dept: CT IMAGING | Facility: HOSPITAL | Age: 22
End: 2022-06-25
Attending: NURSE PRACTITIONER
Payer: COMMERCIAL

## 2022-06-25 ENCOUNTER — HOSPITAL ENCOUNTER (EMERGENCY)
Facility: HOSPITAL | Age: 22
Discharge: HOME | End: 2022-06-25
Attending: NURSE PRACTITIONER | Admitting: NURSE PRACTITIONER
Payer: COMMERCIAL

## 2022-06-25 VITALS
OXYGEN SATURATION: 97 % | SYSTOLIC BLOOD PRESSURE: 123 MMHG | HEIGHT: 60 IN | WEIGHT: 109 LBS | RESPIRATION RATE: 13 BRPM | DIASTOLIC BLOOD PRESSURE: 81 MMHG | HEART RATE: 90 BPM | TEMPERATURE: 98.4 F | BODY MASS INDEX: 21.4 KG/M2

## 2022-06-25 DIAGNOSIS — M54.50 LUMBAR PAIN: ICD-10-CM

## 2022-06-25 DIAGNOSIS — M25.522 LEFT ELBOW PAIN: ICD-10-CM

## 2022-06-25 DIAGNOSIS — S06.0X9A CONCUSSION WITH LOSS OF CONSCIOUSNESS, INITIAL ENCOUNTER: ICD-10-CM

## 2022-06-25 DIAGNOSIS — M25.561 RIGHT KNEE PAIN: ICD-10-CM

## 2022-06-25 DIAGNOSIS — M79.641 PAIN OF RIGHT HAND: ICD-10-CM

## 2022-06-25 DIAGNOSIS — M25.562 ACUTE PAIN OF LEFT KNEE: ICD-10-CM
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
## 2022-06-25 DIAGNOSIS — R10.84 ABDOMINAL PAIN, GENERALIZED: ICD-10-CM

## 2022-06-25 DIAGNOSIS — M54.6 ACUTE THORACIC BACK PAIN, UNSPECIFIED BACK PAIN LATERALITY: ICD-10-CM

## 2022-06-25 LAB
ALBUMIN SERPL-MCNC: 3.9 G/DL (ref 3.4–5)
ALBUMIN UR-MCNC: 10 MG/DL
ALP SERPL-CCNC: 50 U/L (ref 40–150)
ALT SERPL W P-5'-P-CCNC: 25 U/L (ref 0–50)
ANION GAP SERPL CALCULATED.3IONS-SCNC: 10 MMOL/L (ref 3–14)
APPEARANCE UR: CLEAR
AST SERPL W P-5'-P-CCNC: 35 U/L (ref 0–45)
BASOPHILS # BLD AUTO: 0 10E3/UL (ref 0–0.2)
BASOPHILS NFR BLD AUTO: 0 %
BILIRUB SERPL-MCNC: 0.3 MG/DL (ref 0.2–1.3)
BILIRUB UR QL STRIP: NEGATIVE
BUN SERPL-MCNC: 8 MG/DL (ref 7–30)
CALCIUM SERPL-MCNC: 8.5 MG/DL (ref 8.5–10.1)
CHLORIDE BLD-SCNC: 109 MMOL/L (ref 94–109)
CO2 SERPL-SCNC: 22 MMOL/L (ref 20–32)
COLOR UR AUTO: ABNORMAL
CREAT SERPL-MCNC: 0.56 MG/DL (ref 0.52–1.04)
EOSINOPHIL # BLD AUTO: 0 10E3/UL (ref 0–0.7)
EOSINOPHIL NFR BLD AUTO: 0 %
ERYTHROCYTE [DISTWIDTH] IN BLOOD BY AUTOMATED COUNT: 13.5 % (ref 10–15)
GFR SERPL CREATININE-BSD FRML MDRD: >90 ML/MIN/1.73M2
GLUCOSE BLD-MCNC: 102 MG/DL (ref 70–99)
GLUCOSE UR STRIP-MCNC: NEGATIVE MG/DL
HCT VFR BLD AUTO: 40.5 % (ref 35–47)
HGB BLD-MCNC: 14 G/DL (ref 11.7–15.7)
HGB UR QL STRIP: NEGATIVE
HOLD SPECIMEN: NORMAL
IMM GRANULOCYTES # BLD: 0 10E3/UL
IMM GRANULOCYTES NFR BLD: 0 %
KETONES UR STRIP-MCNC: ABNORMAL MG/DL
LEUKOCYTE ESTERASE UR QL STRIP: NEGATIVE
LYMPHOCYTES # BLD AUTO: 1.5 10E3/UL (ref 0.8–5.3)
LYMPHOCYTES NFR BLD AUTO: 13 %
MAGNESIUM SERPL-MCNC: 1.8 MG/DL (ref 1.6–2.3)
MCH RBC QN AUTO: 30.9 PG (ref 26.5–33)
MCHC RBC AUTO-ENTMCNC: 34.6 G/DL (ref 31.5–36.5)
MCV RBC AUTO: 89 FL (ref 78–100)
MONOCYTES # BLD AUTO: 0.5 10E3/UL (ref 0–1.3)
MONOCYTES NFR BLD AUTO: 5 %
MUCOUS THREADS #/AREA URNS LPF: PRESENT /LPF
NEUTROPHILS # BLD AUTO: 9.1 10E3/UL (ref 1.6–8.3)
NEUTROPHILS NFR BLD AUTO: 82 %
NITRATE UR QL: NEGATIVE
NRBC # BLD AUTO: 0 10E3/UL
NRBC BLD AUTO-RTO: 0 /100
PH UR STRIP: 7 [PH] (ref 4.7–8)
PLATELET # BLD AUTO: 369 10E3/UL (ref 150–450)
POTASSIUM BLD-SCNC: 3.6 MMOL/L (ref 3.4–5.3)
PROT SERPL-MCNC: 6.9 G/DL (ref 6.8–8.8)
RBC # BLD AUTO: 4.53 10E6/UL (ref 3.8–5.2)
RBC URINE: 1 /HPF
SODIUM SERPL-SCNC: 141 MMOL/L (ref 133–144)
SP GR UR STRIP: 1.02 (ref 1–1.03)
SQUAMOUS EPITHELIAL: 1 /HPF
UROBILINOGEN UR STRIP-MCNC: 2 MG/DL
WBC # BLD AUTO: 11.2 10E3/UL (ref 4–11)
WBC URINE: 1 /HPF

## 2022-06-25 PROCEDURE — 96374 THER/PROPH/DIAG INJ IV PUSH: CPT | Mod: XU

## 2022-06-25 PROCEDURE — 72125 CT NECK SPINE W/O DYE: CPT

## 2022-06-25 PROCEDURE — 83735 ASSAY OF MAGNESIUM: CPT | Performed by: NURSE PRACTITIONER

## 2022-06-25 PROCEDURE — 73130 X-RAY EXAM OF HAND: CPT | Mod: RT

## 2022-06-25 PROCEDURE — 93005 ELECTROCARDIOGRAM TRACING: CPT

## 2022-06-25 PROCEDURE — 99285 EMERGENCY DEPT VISIT HI MDM: CPT | Mod: 25

## 2022-06-25 PROCEDURE — 36415 COLL VENOUS BLD VENIPUNCTURE: CPT | Performed by: NURSE PRACTITIONER

## 2022-06-25 PROCEDURE — 96375 TX/PRO/DX INJ NEW DRUG ADDON: CPT | Mod: XU

## 2022-06-25 PROCEDURE — 73562 X-RAY EXAM OF KNEE 3: CPT | Mod: 50

## 2022-06-25 PROCEDURE — 99285 EMERGENCY DEPT VISIT HI MDM: CPT | Performed by: NURSE PRACTITIONER

## 2022-06-25 PROCEDURE — 85025 COMPLETE CBC W/AUTO DIFF WBC: CPT | Performed by: NURSE PRACTITIONER

## 2022-06-25 PROCEDURE — 74177 CT ABD & PELVIS W/CONTRAST: CPT

## 2022-06-25 PROCEDURE — 93010 ELECTROCARDIOGRAM REPORT: CPT | Performed by: INTERNAL MEDICINE

## 2022-06-25 PROCEDURE — 81001 URINALYSIS AUTO W/SCOPE: CPT | Performed by: NURSE PRACTITIONER

## 2022-06-25 PROCEDURE — 73080 X-RAY EXAM OF ELBOW: CPT | Mod: LT

## 2022-06-25 PROCEDURE — 70450 CT HEAD/BRAIN W/O DYE: CPT

## 2022-06-25 PROCEDURE — 250N000013 HC RX MED GY IP 250 OP 250 PS 637: Performed by: NURSE PRACTITIONER

## 2022-06-25 PROCEDURE — 250N000011 HC RX IP 250 OP 636: Performed by: NURSE PRACTITIONER

## 2022-06-25 PROCEDURE — 80053 COMPREHEN METABOLIC PANEL: CPT | Performed by: NURSE PRACTITIONER

## 2022-06-25 PROCEDURE — 96376 TX/PRO/DX INJ SAME DRUG ADON: CPT

## 2022-06-25 RX ORDER — KETOROLAC TROMETHAMINE 30 MG/ML
30 INJECTION, SOLUTION INTRAMUSCULAR; INTRAVENOUS ONCE
Status: COMPLETED | OUTPATIENT
Start: 2022-06-25 | End: 2022-06-25

## 2022-06-25 RX ORDER — HYDROMORPHONE HYDROCHLORIDE 1 MG/ML
0.5 INJECTION, SOLUTION INTRAMUSCULAR; INTRAVENOUS; SUBCUTANEOUS
Status: DISCONTINUED | OUTPATIENT
Start: 2022-06-25 | End: 2022-06-25 | Stop reason: HOSPADM

## 2022-06-25 RX ORDER — ONDANSETRON 2 MG/ML
4 INJECTION INTRAMUSCULAR; INTRAVENOUS ONCE
Status: COMPLETED | OUTPATIENT
Start: 2022-06-25 | End: 2022-06-25

## 2022-06-25 RX ORDER — CYCLOBENZAPRINE HCL 5 MG
5 TABLET ORAL ONCE
Status: COMPLETED | OUTPATIENT
Start: 2022-06-25 | End: 2022-06-25

## 2022-06-25 RX ORDER — CYCLOBENZAPRINE HCL 10 MG
10 TABLET ORAL 3 TIMES DAILY PRN
Qty: 15 TABLET | Refills: 0 | Status: SHIPPED | OUTPATIENT
Start: 2022-06-25 | End: 2022-06-29

## 2022-06-25 RX ORDER — IOPAMIDOL 755 MG/ML
53 INJECTION, SOLUTION INTRAVASCULAR ONCE
Status: DISCONTINUED | OUTPATIENT
Start: 2022-06-25 | End: 2022-06-25 | Stop reason: HOSPADM

## 2022-06-25 RX ORDER — IOPAMIDOL 755 MG/ML
62 INJECTION, SOLUTION INTRAVASCULAR ONCE
Status: DISCONTINUED | OUTPATIENT
Start: 2022-06-25 | End: 2022-06-25

## 2022-06-25 RX ORDER — IBUPROFEN 600 MG/1
600 TABLET, FILM COATED ORAL EVERY 6 HOURS PRN
Qty: 30 TABLET | Refills: 0 | Status: SHIPPED | OUTPATIENT
Start: 2022-06-25 | End: 2022-06-29

## 2022-06-25 RX ORDER — ONDANSETRON 4 MG/1
4 TABLET, ORALLY DISINTEGRATING ORAL EVERY 8 HOURS PRN
Qty: 10 TABLET | Refills: 0 | Status: SHIPPED | OUTPATIENT
Start: 2022-06-25 | End: 2022-08-25

## 2022-06-25 RX ORDER — HYDROMORPHONE HYDROCHLORIDE 1 MG/ML
0.5 INJECTION, SOLUTION INTRAMUSCULAR; INTRAVENOUS; SUBCUTANEOUS ONCE
Status: COMPLETED | OUTPATIENT
Start: 2022-06-25 | End: 2022-06-25

## 2022-06-25 RX ORDER — OXYCODONE HYDROCHLORIDE 5 MG/1
5 TABLET ORAL EVERY 6 HOURS PRN
Qty: 10 TABLET | Refills: 0 | Status: SHIPPED | OUTPATIENT
Start: 2022-06-25 | End: 2022-08-25

## 2022-06-25 RX ORDER — OXYCODONE HYDROCHLORIDE 5 MG/1
5 TABLET ORAL ONCE
Status: COMPLETED | OUTPATIENT
Start: 2022-06-25 | End: 2022-06-25

## 2022-06-25 RX ORDER — ACETAMINOPHEN 325 MG/1
975 TABLET ORAL ONCE
Status: COMPLETED | OUTPATIENT
Start: 2022-06-25 | End: 2022-06-25

## 2022-06-25 RX ADMIN — HYDROMORPHONE HYDROCHLORIDE 0.5 MG: 1 INJECTION, SOLUTION INTRAMUSCULAR; INTRAVENOUS; SUBCUTANEOUS at 13:04

## 2022-06-25 RX ADMIN — ACETAMINOPHEN 975 MG: 325 TABLET ORAL at 13:34

## 2022-06-25 RX ADMIN — HYDROMORPHONE HYDROCHLORIDE 0.5 MG: 1 INJECTION, SOLUTION INTRAMUSCULAR; INTRAVENOUS; SUBCUTANEOUS at 14:15

## 2022-06-25 RX ADMIN — OXYCODONE HYDROCHLORIDE 5 MG: 5 TABLET ORAL at 13:34

## 2022-06-25 RX ADMIN — CYCLOBENZAPRINE HYDROCHLORIDE 5 MG: 5 TABLET, FILM COATED ORAL at 13:39

## 2022-06-25 RX ADMIN — KETOROLAC TROMETHAMINE 30 MG: 30 INJECTION, SOLUTION INTRAMUSCULAR; INTRAVENOUS at 13:33

## 2022-06-25 RX ADMIN — ONDANSETRON 4 MG: 2 INJECTION INTRAMUSCULAR; INTRAVENOUS at 13:03

## 2022-06-25 ASSESSMENT — ENCOUNTER SYMPTOMS
FATIGUE: 0
NAUSEA: 0
COUGH: 0
SHORTNESS OF BREATH: 0
HEMATURIA: 0
NECK STIFFNESS: 0
LIGHT-HEADEDNESS: 1
VOMITING: 0
DIFFICULTY URINATING: 0
BLOOD IN STOOL: 0
WOUND: 1
DYSURIA: 0
COLOR CHANGE: 0
ABDOMINAL PAIN: 1
CONSTIPATION: 0
NECK PAIN: 0
PHOTOPHOBIA: 0
ARTHRALGIAS: 1
HEADACHES: 1
DIZZINESS: 1
FLANK PAIN: 0
NERVOUS/ANXIOUS: 1
BACK PAIN: 1
PALPITATIONS: 0
SEIZURES: 0
DIARRHEA: 0

## 2022-06-25 NOTE — Clinical Note
Marimar Jarrett was seen and treated in our emergency department on 6/25/2022.  She may return to work on 07/04/2022.       If you have any questions or concerns, please don't hesitate to call.      Patricia Rubin, CNP
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
## 2022-06-28 ENCOUNTER — TELEPHONE (OUTPATIENT)
Dept: PEDIATRICS | Facility: OTHER | Age: 22
End: 2022-06-28
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
## 2022-06-29 ENCOUNTER — OFFICE VISIT (OUTPATIENT)
Dept: PEDIATRICS | Facility: OTHER | Age: 22
End: 2022-06-29
Attending: NURSE PRACTITIONER
Payer: COMMERCIAL

## 2022-06-29 VITALS
TEMPERATURE: 97.8 F | HEART RATE: 116 BPM | OXYGEN SATURATION: 99 % | SYSTOLIC BLOOD PRESSURE: 118 MMHG | DIASTOLIC BLOOD PRESSURE: 78 MMHG | WEIGHT: 115 LBS | BODY MASS INDEX: 22.46 KG/M2 | RESPIRATION RATE: 16 BRPM
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
## 2022-06-29 DIAGNOSIS — F41.9 ANXIETY: ICD-10-CM

## 2022-06-29 DIAGNOSIS — M54.9 ACUTE BACK PAIN, UNSPECIFIED BACK LOCATION, UNSPECIFIED BACK PAIN LATERALITY: ICD-10-CM

## 2022-06-29 DIAGNOSIS — B00.1 RECURRENT COLD SORES: ICD-10-CM

## 2022-06-29 DIAGNOSIS — G44.89 OTHER HEADACHE SYNDROME: ICD-10-CM

## 2022-06-29 DIAGNOSIS — T43.205A SEROTONIN WITHDRAWAL SYNDROME, INITIAL ENCOUNTER: ICD-10-CM

## 2022-06-29 DIAGNOSIS — S80.212A KNEE ABRASION, LEFT, INITIAL ENCOUNTER: ICD-10-CM

## 2022-06-29 DIAGNOSIS — S06.0X0D CONCUSSION WITHOUT LOSS OF CONSCIOUSNESS, SUBSEQUENT ENCOUNTER: ICD-10-CM

## 2022-06-29 PROCEDURE — 99215 OFFICE O/P EST HI 40 MIN: CPT | Performed by: NURSE PRACTITIONER

## 2022-06-29 PROCEDURE — G0463 HOSPITAL OUTPT CLINIC VISIT: HCPCS | Performed by: NURSE PRACTITIONER

## 2022-06-29 RX ORDER — LIDOCAINE 50 MG/G
1 PATCH TOPICAL EVERY 24 HOURS
Qty: 30 UNITS | Refills: 0 | Status: SHIPPED | OUTPATIENT
Start: 2022-06-29 | End: 2022-08-25

## 2022-06-29 RX ORDER — VALACYCLOVIR HYDROCHLORIDE 500 MG/1
500 TABLET, FILM COATED ORAL DAILY
Qty: 30 TABLET | Refills: 11 | Status: SHIPPED | OUTPATIENT
Start: 2022-06-29 | End: 2023-07-27

## 2022-06-29 RX ORDER — CYCLOBENZAPRINE HCL 10 MG
10 TABLET ORAL 3 TIMES DAILY PRN
Qty: 15 TABLET | Refills: 0 | Status: SHIPPED | OUTPATIENT
Start: 2022-06-29 | End: 2022-09-26

## 2022-06-29 RX ORDER — IBUPROFEN 600 MG/1
600 TABLET, FILM COATED ORAL EVERY 6 HOURS PRN
Qty: 30 TABLET | Refills: 0 | Status: SHIPPED | OUTPATIENT
Start: 2022-06-29 | End: 2022-07-21

## 2022-06-29 ASSESSMENT — PAIN SCALES - GENERAL: PAINLEVEL: SEVERE PAIN (6)

## 2022-06-29 NOTE — NURSING NOTE
Chief Complaint   Patient presents with     ER F/U       Initial /78 (BP Location: Right arm, Patient Position: Chair, Cuff Size: Adult Regular)   Pulse 116   Temp 97.8  F (36.6  C) (Tympanic)   Resp 16   Wt 52.2 kg (115 lb)   SpO2 99%   BMI 22.46 kg/m   Estimated body mass index is 22.46 kg/m  as calculated from the following:    Height as of 6/25/22: 1.524 m (5').    Weight as of this encounter: 52.2 kg (115 lb).  Medication Reconciliation: complete  Mary Anne LPN
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
## 2022-07-20 DIAGNOSIS — F41.9 ANXIETY: ICD-10-CM

## 2022-07-20 DIAGNOSIS — F32.89 OTHER DEPRESSION: ICD-10-CM

## 2022-07-21 RX ORDER — ESCITALOPRAM OXALATE 20 MG/1
TABLET ORAL
Qty: 30 TABLET | Refills: 1 | Status: SHIPPED | OUTPATIENT
Start: 2022-07-21 | End: 2023-03-16

## 2022-07-21 NOTE — TELEPHONE ENCOUNTER
Escitalopram      Last Written Prescription Date:  6/15/22  Last Fill Quantity: 30,   # refills: 0  Last Office Visit: 6/29/22  Future Office visit:

## 2022-08-24 DIAGNOSIS — R79.0 LOW SERUM FERRITIN LEVEL: ICD-10-CM

## 2022-08-25 ENCOUNTER — OFFICE VISIT (OUTPATIENT)
Dept: PEDIATRICS | Facility: OTHER | Age: 22
End: 2022-08-25
Attending: NURSE PRACTITIONER
Payer: COMMERCIAL

## 2022-08-25 VITALS
HEART RATE: 58 BPM | WEIGHT: 115 LBS | SYSTOLIC BLOOD PRESSURE: 102 MMHG | OXYGEN SATURATION: 98 % | BODY MASS INDEX: 22.46 KG/M2 | RESPIRATION RATE: 16 BRPM | TEMPERATURE: 97.2 F | DIASTOLIC BLOOD PRESSURE: 60 MMHG

## 2022-08-25 DIAGNOSIS — F41.9 ANXIETY: ICD-10-CM

## 2022-08-25 DIAGNOSIS — R41.840 ATTENTION AND CONCENTRATION DEFICIT: ICD-10-CM

## 2022-08-25 DIAGNOSIS — R45.851 SUICIDAL IDEATION: ICD-10-CM

## 2022-08-25 DIAGNOSIS — R79.0 LOW FERRITIN: ICD-10-CM

## 2022-08-25 DIAGNOSIS — F32.89 OTHER DEPRESSION: Primary | ICD-10-CM

## 2022-08-25 LAB — FERRITIN SERPL-MCNC: 26 NG/ML (ref 12–150)

## 2022-08-25 PROCEDURE — G0463 HOSPITAL OUTPT CLINIC VISIT: HCPCS

## 2022-08-25 PROCEDURE — 36415 COLL VENOUS BLD VENIPUNCTURE: CPT | Mod: ZL | Performed by: NURSE PRACTITIONER

## 2022-08-25 PROCEDURE — 99214 OFFICE O/P EST MOD 30 MIN: CPT | Performed by: NURSE PRACTITIONER

## 2022-08-25 PROCEDURE — 82728 ASSAY OF FERRITIN: CPT | Mod: ZL | Performed by: NURSE PRACTITIONER

## 2022-08-25 RX ORDER — LISDEXAMFETAMINE DIMESYLATE 10 MG/1
10 CAPSULE ORAL EVERY MORNING
Qty: 30 CAPSULE | Refills: 0 | Status: SHIPPED | OUTPATIENT
Start: 2022-08-25 | End: 2022-09-08 | Stop reason: DRUGHIGH

## 2022-08-25 RX ORDER — FERROUS SULFATE 325(65) MG
TABLET ORAL
Qty: 30 TABLET | Refills: 2 | Status: SHIPPED | OUTPATIENT
Start: 2022-08-25 | End: 2022-12-12

## 2022-08-25 ASSESSMENT — ANXIETY QUESTIONNAIRES
6. BECOMING EASILY ANNOYED OR IRRITABLE: NEARLY EVERY DAY
5. BEING SO RESTLESS THAT IT IS HARD TO SIT STILL: NEARLY EVERY DAY
4. TROUBLE RELAXING: NEARLY EVERY DAY
GAD7 TOTAL SCORE: 21
GAD7 TOTAL SCORE: 21
7. FEELING AFRAID AS IF SOMETHING AWFUL MIGHT HAPPEN: NEARLY EVERY DAY
2. NOT BEING ABLE TO STOP OR CONTROL WORRYING: NEARLY EVERY DAY
1. FEELING NERVOUS, ANXIOUS, OR ON EDGE: NEARLY EVERY DAY
3. WORRYING TOO MUCH ABOUT DIFFERENT THINGS: NEARLY EVERY DAY
IF YOU CHECKED OFF ANY PROBLEMS ON THIS QUESTIONNAIRE, HOW DIFFICULT HAVE THESE PROBLEMS MADE IT FOR YOU TO DO YOUR WORK, TAKE CARE OF THINGS AT HOME, OR GET ALONG WITH OTHER PEOPLE: NOT DIFFICULT AT ALL

## 2022-08-25 ASSESSMENT — PATIENT HEALTH QUESTIONNAIRE - PHQ9: SUM OF ALL RESPONSES TO PHQ QUESTIONS 1-9: 24

## 2022-08-25 ASSESSMENT — PAIN SCALES - GENERAL: PAINLEVEL: NO PAIN (0)

## 2022-08-25 NOTE — NURSING NOTE
Chief Complaint   Patient presents with     Depression       Initial /60 (BP Location: Left arm, Patient Position: Chair, Cuff Size: Adult Regular)   Pulse 58   Temp 97.2  F (36.2  C) (Tympanic)   Resp 16   Wt 52.2 kg (115 lb)   SpO2 98%   BMI 22.46 kg/m   Estimated body mass index is 22.46 kg/m  as calculated from the following:    Height as of 6/25/22: 1.524 m (5').    Weight as of this encounter: 52.2 kg (115 lb).  Medication Reconciliation: complete  Mary Anne LPN

## 2022-08-25 NOTE — PROGRESS NOTES
Assessment & Plan     Other depression  Will continue Lexapro 20 mg, as it is helping somewhat with paranoia and lucid dreaming. Has an appointment on 9/21/22 with Samreen Angeles at [facility], for further evaluation and management.    Anxiety      Suicidal ideation  Adamantly states no plans or desire, but has random thoughts that enter her mind.    Low ferritin  Ferritin is normal today, but still on the low end of normal. Recommend continuing the iron supplement.  - Ferritin    Attention and concentration deficit  Possible that concomitant ADHD is increasing anxiety symptoms. However, I do not diagnose adult ADHD, so will follow up at [facility] for definitive diagnosis. As Marimar noticed a benefit from taking mom's Adderall, will trial a stimulant with close follow up. Adderall does not appear to be covered by insurance, so will trial Vyvanse. Start with 10 mg for at least 4-5 days, then may increase to 20 mg if needed. Marimar will let me know via MyChart if she is changing dosage or of any side effects. Discussed common side effects, and the need to monitor use of a controlled substance.  - lisdexamfetamine (VYVANSE) 10 MG capsule; Take 1 capsule (10 mg) by mouth every morning      34 minutes spent on the date of the encounter doing chart review, history and exam, documentation and further activities per the note           Return in about 2 weeks (around 9/8/2022) for Medication follow up.    KENDRICK Oakes [facility] - ROCIO Minaya is a 22 year old, presenting for the following health issues:  Depression      HPI     Depression and Anxiety Follow-Up    How are you doing with your depression since your last visit? Worsened - but no longer having paranoia symptoms. Lucid dreaming has also improved.    How are you doing with your anxiety since your last visit?  Worsened anxiety symptoms     Are you having other symptoms that might be associated with depression or  "anxiety? No    Have you had a significant life event? No     Do you have any concerns with your use of alcohol or other drugs? No     Endorses suicidal ideation, states random thoughts of suicide that enter her mind. Adamantly denies a plan. States she does not have any desire to attempt. Safety plan in place.     Recommended to follow up with [facility] to establish for mental health at last visit on 6/15/22, due to complex needs. Has an appointment upcoming on 9/21/22 with Samreen Angeles CNP.    Ferritin was low at last visit. Recommended to start iron supplementation. She has been taking iron daily; states her hair is no longer falling out.    Continues to work as a CNA. Plans to switch jobs, and work at a local  starting in September. She is excited to try something new.    She has continued to feel that her mind has been racing, constantly all over the place. She tried one of her mom's Adderall (15 mg), and felt that her mind quieted down. \"I felt normal.\"     Long-standing attention issues, causing difficulty in school. Mom has ADHD, niece has ADHD.     Social History     Tobacco Use     Smoking status: Never Smoker     Smokeless tobacco: Never Used   Vaping Use     Vaping Use: Never used   Substance Use Topics     Alcohol use: No     Drug use: No     PHQ 2/21/2022 6/15/2022 8/25/2022   PHQ-9 Total Score 24 24 24   Q9: Thoughts of better off dead/self-harm past 2 weeks Not at all Not at all Several days     ANAYA-7 SCORE 2/21/2022 6/15/2022 8/25/2022   Total Score 21 21 21     Last PHQ-9 8/25/2022   1.  Little interest or pleasure in doing things 3   2.  Feeling down, depressed, or hopeless 3   3.  Trouble falling or staying asleep, or sleeping too much 3   4.  Feeling tired or having little energy 3   5.  Poor appetite or overeating 2   6.  Feeling bad about yourself 3   7.  Trouble concentrating 3   8.  Moving slowly or restless 3   Q9: Thoughts of better off dead/self-harm past 2 weeks 1   PHQ-9 Total " Score 24   Difficulty at work, home, or with people Not difficult at all     ANAYA-7  8/25/2022   1. Feeling nervous, anxious, or on edge 3   2. Not being able to stop or control worrying 3   3. Worrying too much about different things 3   4. Trouble relaxing 3   5. Being so restless that it is hard to sit still 3   6. Becoming easily annoyed or irritable 3   7. Feeling afraid, as if something awful might happen 3   ANAYA-7 Total Score 21   If you checked any problems, how difficult have they made it for you to do your work, take care of things at home, or get along with other people? Not difficult at all       Suicide Assessment Five-step Evaluation and Treatment (SAFE-T)          Review of Systems   Constitutional, HEENT, cardiovascular, pulmonary, gi and gu systems are negative, except as otherwise noted.      Objective    /60 (BP Location: Left arm, Patient Position: Chair, Cuff Size: Adult Regular)   Pulse 58   Temp 97.2  F (36.2  C) (Tympanic)   Resp 16   Wt 52.2 kg (115 lb)   SpO2 98%   BMI 22.46 kg/m    Body mass index is 22.46 kg/m .  Physical Exam   GENERAL: healthy, alert and no distress  RESP: lungs clear to auscultation - no rales, rhonchi or wheezes  CV: regular rate and rhythm, normal S1 S2, no S3 or S4, no murmur, click or rub, no peripheral edema and peripheral pulses strong  PSYCH: mentation appears normal, affect normal/bright, anxious, judgement and insight intact and appearance well groomed    Results for orders placed or performed in visit on 08/25/22 (from the past 24 hour(s))   Ferritin   Result Value Ref Range    Ferritin 26 12 - 150 ng/mL                   .  ..

## 2022-09-04 ENCOUNTER — HEALTH MAINTENANCE LETTER (OUTPATIENT)
Age: 22
End: 2022-09-04

## 2022-09-25 ENCOUNTER — MYC REFILL (OUTPATIENT)
Dept: PEDIATRICS | Facility: OTHER | Age: 22
End: 2022-09-25

## 2022-09-25 DIAGNOSIS — Z30.8 ENCOUNTER FOR OTHER CONTRACEPTIVE MANAGEMENT: ICD-10-CM

## 2022-09-25 DIAGNOSIS — R41.840 ATTENTION AND CONCENTRATION DEFICIT: ICD-10-CM

## 2022-09-26 RX ORDER — LISDEXAMFETAMINE DIMESYLATE 40 MG/1
40 CAPSULE ORAL EVERY MORNING
Qty: 30 CAPSULE | Refills: 0 | OUTPATIENT
Start: 2022-09-26

## 2022-09-26 NOTE — TELEPHONE ENCOUNTER
Attempt # 1  Outcome: Left Message   Comment: lvm for pt to call back to schedule med review; advised pt is too early for fill

## 2022-09-26 NOTE — TELEPHONE ENCOUNTER
lisdexamfetamine      Last Written Prescription Date:  9/8/22  Last Fill Quantity: 30,   # refills: 0  Last Office Visit: 8/25/22  Future Office visit:       Routing refill request to provider for review/approval because:

## 2022-09-26 NOTE — TELEPHONE ENCOUNTER
Not due for a refill until 10/8/22. Marimar needs a follow up appointment before her next refill; please call to schedule.

## 2022-09-27 NOTE — TELEPHONE ENCOUNTER
Patient requested vyvanse, by mistake, on My chart refill request.  States needing a refill of birth control.    Should she still make a medication review kevin't?  If yes, please message her on My Chart      Isibloom 0.15-30 mg-mcg      Last Written Prescription Date:  10/28/21  Last Fill Quantity: 28,   # refills: 11  Last Office Visit: 8/25/22  Future Office visit:       Routing refill request to provider for review/approval because:  Drug not on the [facility], [facility] or [facility] refill protocol

## 2022-09-28 RX ORDER — DESOGESTREL AND ETHINYL ESTRADIOL 0.15-0.03
1 KIT ORAL DAILY
Qty: 28 TABLET | Refills: 11 | Status: ON HOLD | OUTPATIENT
Start: 2022-09-28 | End: 2023-03-17 | Stop reason: SINTOL

## 2022-12-09 DIAGNOSIS — R79.0 LOW SERUM FERRITIN LEVEL: ICD-10-CM

## 2022-12-09 NOTE — TELEPHONE ENCOUNTER
ferosul      Last Written Prescription Date:  8/25/22  Last Fill Quantity: 30,   # refills: 2  Last Office Visit: 8/25/22  Future Office visit:

## 2022-12-12 RX ORDER — FERROUS SULFATE 325(65) MG
TABLET ORAL
Qty: 30 TABLET | Refills: 0 | Status: SHIPPED | OUTPATIENT
Start: 2022-12-12 | End: 2023-03-10

## 2023-02-03 ENCOUNTER — APPOINTMENT (OUTPATIENT)
Dept: OCCUPATIONAL MEDICINE | Facility: OTHER | Age: 23
End: 2023-02-03

## 2023-02-10 ENCOUNTER — APPOINTMENT (OUTPATIENT)
Dept: OCCUPATIONAL MEDICINE | Facility: OTHER | Age: 23
End: 2023-02-10

## 2023-02-20 DIAGNOSIS — Z11.3 SCREEN FOR STD (SEXUALLY TRANSMITTED DISEASE): Primary | ICD-10-CM

## 2023-03-09 DIAGNOSIS — R79.0 LOW SERUM FERRITIN LEVEL: ICD-10-CM

## 2023-03-10 RX ORDER — FERROUS SULFATE 325(65) MG
TABLET ORAL
Qty: 30 TABLET | Refills: 4 | Status: SHIPPED | OUTPATIENT
Start: 2023-03-10 | End: 2023-09-28

## 2023-03-10 NOTE — TELEPHONE ENCOUNTER
Ferosul 325 (65 Fe) MG tablet   Last Written Prescription Date:  12/12/2022  Last Fill Quantity: 30,   # refills: 0  Last Office Visit: 08/25/2022

## 2023-03-16 ENCOUNTER — HOSPITAL ENCOUNTER (INPATIENT)
Facility: HOSPITAL | Age: 23
LOS: 3 days | Discharge: HOME | End: 2023-03-19
Attending: NURSE PRACTITIONER | Admitting: STUDENT IN AN ORGANIZED HEALTH CARE EDUCATION/TRAINING PROGRAM
Payer: COMMERCIAL

## 2023-03-16 DIAGNOSIS — F41.9 ANXIETY: Primary | ICD-10-CM

## 2023-03-16 DIAGNOSIS — R45.851 SUICIDAL IDEATION: ICD-10-CM

## 2023-03-16 DIAGNOSIS — F32.89 OTHER DEPRESSION: ICD-10-CM

## 2023-03-16 DIAGNOSIS — Z11.52 ENCOUNTER FOR SCREENING LABORATORY TESTING FOR COVID-19 VIRUS: ICD-10-CM

## 2023-03-16 LAB
AMPHETAMINES UR QL: NOT DETECTED
APAP SERPL-MCNC: <5 UG/ML (ref 10–30)
BARBITURATES UR QL SCN: NOT DETECTED
BENZODIAZ UR QL SCN: DETECTED
BUPRENORPHINE UR QL: NOT DETECTED
CANNABINOIDS UR QL: DETECTED
COCAINE UR QL SCN: NOT DETECTED
D-METHAMPHET UR QL: NOT DETECTED
ETHANOL SERPL-MCNC: <0.01 G/DL
HCG UR QL: NEGATIVE
METHADONE UR QL SCN: NOT DETECTED
OPIATES UR QL SCN: NOT DETECTED
OXYCODONE UR QL SCN: NOT DETECTED
PCP UR QL SCN: NOT DETECTED
PROPOXYPH UR QL: NOT DETECTED
SALICYLATES SERPL-MCNC: <0.3 MG/DL
SARS-COV-2 RNA RESP QL NAA+PROBE: NEGATIVE
TRICYCLICS UR QL SCN: NOT DETECTED

## 2023-03-16 PROCEDURE — 99285 EMERGENCY DEPT VISIT HI MDM: CPT | Mod: 25 | Performed by: NURSE PRACTITIONER

## 2023-03-16 PROCEDURE — 80179 DRUG ASSAY SALICYLATE: CPT | Performed by: NURSE PRACTITIONER

## 2023-03-16 PROCEDURE — 82077 ASSAY SPEC XCP UR&BREATH IA: CPT | Performed by: NURSE PRACTITIONER

## 2023-03-16 PROCEDURE — 124N000001 HC R&B MH

## 2023-03-16 PROCEDURE — 81025 URINE PREGNANCY TEST: CPT | Performed by: NURSE PRACTITIONER

## 2023-03-16 PROCEDURE — 36415 COLL VENOUS BLD VENIPUNCTURE: CPT | Performed by: NURSE PRACTITIONER

## 2023-03-16 PROCEDURE — 80306 DRUG TEST PRSMV INSTRMNT: CPT | Performed by: NURSE PRACTITIONER

## 2023-03-16 PROCEDURE — 80143 DRUG ASSAY ACETAMINOPHEN: CPT | Performed by: NURSE PRACTITIONER

## 2023-03-16 PROCEDURE — U0005 INFEC AGEN DETEC AMPLI PROBE: HCPCS | Performed by: NURSE PRACTITIONER

## 2023-03-16 PROCEDURE — 99285 EMERGENCY DEPT VISIT HI MDM: CPT | Performed by: NURSE PRACTITIONER

## 2023-03-16 PROCEDURE — 250N000013 HC RX MED GY IP 250 OP 250 PS 637: Performed by: NURSE PRACTITIONER

## 2023-03-16 PROCEDURE — C9803 HOPD COVID-19 SPEC COLLECT: HCPCS | Performed by: NURSE PRACTITIONER

## 2023-03-16 RX ORDER — HYDROXYZINE HYDROCHLORIDE 25 MG/1
25 TABLET, FILM COATED ORAL EVERY 4 HOURS PRN
Status: DISCONTINUED | OUTPATIENT
Start: 2023-03-16 | End: 2023-03-19 | Stop reason: HOSPADM

## 2023-03-16 RX ORDER — OLANZAPINE 10 MG/2ML
10 INJECTION, POWDER, FOR SOLUTION INTRAMUSCULAR 3 TIMES DAILY PRN
Status: DISCONTINUED | OUTPATIENT
Start: 2023-03-16 | End: 2023-03-17

## 2023-03-16 RX ORDER — PRAZOSIN HYDROCHLORIDE 2 MG/1
2 CAPSULE ORAL AT BEDTIME
Status: ON HOLD | COMMUNITY
Start: 2023-03-14 | End: 2023-03-19

## 2023-03-16 RX ORDER — MAGNESIUM HYDROXIDE/ALUMINUM HYDROXICE/SIMETHICONE 120; 1200; 1200 MG/30ML; MG/30ML; MG/30ML
30 SUSPENSION ORAL EVERY 4 HOURS PRN
Status: DISCONTINUED | OUTPATIENT
Start: 2023-03-16 | End: 2023-03-19 | Stop reason: HOSPADM

## 2023-03-16 RX ORDER — ACETAMINOPHEN 325 MG/1
650 TABLET ORAL EVERY 4 HOURS PRN
Status: DISCONTINUED | OUTPATIENT
Start: 2023-03-16 | End: 2023-03-19 | Stop reason: HOSPADM

## 2023-03-16 RX ORDER — LORAZEPAM 0.5 MG/1
.5-1 TABLET ORAL DAILY PRN
Status: DISCONTINUED | OUTPATIENT
Start: 2023-03-16 | End: 2023-03-17

## 2023-03-16 RX ORDER — OLANZAPINE 10 MG/1
10 TABLET ORAL 3 TIMES DAILY PRN
Status: DISCONTINUED | OUTPATIENT
Start: 2023-03-16 | End: 2023-03-17

## 2023-03-16 RX ORDER — LANOLIN ALCOHOL/MO/W.PET/CERES
3 CREAM (GRAM) TOPICAL
Status: DISCONTINUED | OUTPATIENT
Start: 2023-03-16 | End: 2023-03-19 | Stop reason: HOSPADM

## 2023-03-16 RX ADMIN — LORAZEPAM 0.5 MG: 0.5 TABLET ORAL at 23:30

## 2023-03-16 ASSESSMENT — ACTIVITIES OF DAILY LIVING (ADL)
ADLS_ACUITY_SCORE: 35
ADLS_ACUITY_SCORE: 45

## 2023-03-16 NOTE — LETTER
[address] [address]  Phone: [phone]  Fax: [phone]            03/19/23        Marimar Jarrett  [address] [facility] [address]      To whom it may concern:     Ms Marimar Jarrett has been under our care here at [facility] from 3/16/23 through 3/19/23, discharging in stable condition.    Sincerely,          KENDRICK Bundy, CNP

## 2023-03-17 PROCEDURE — 99223 1ST HOSP IP/OBS HIGH 75: CPT | Mod: AI | Performed by: PSYCHIATRY & NEUROLOGY

## 2023-03-17 PROCEDURE — 250N000013 HC RX MED GY IP 250 OP 250 PS 637: Performed by: NURSE PRACTITIONER

## 2023-03-17 PROCEDURE — 99221 1ST HOSP IP/OBS SF/LOW 40: CPT | Performed by: NURSE PRACTITIONER

## 2023-03-17 PROCEDURE — 124N000001 HC R&B MH

## 2023-03-17 PROCEDURE — 250N000013 HC RX MED GY IP 250 OP 250 PS 637: Performed by: PSYCHIATRY & NEUROLOGY

## 2023-03-17 RX ORDER — TRAZODONE HYDROCHLORIDE 50 MG/1
50 TABLET, FILM COATED ORAL AT BEDTIME
Status: DISCONTINUED | OUTPATIENT
Start: 2023-03-17 | End: 2023-03-19 | Stop reason: HOSPADM

## 2023-03-17 RX ORDER — LISDEXAMFETAMINE DIMESYLATE 60 MG/1
60 CAPSULE ORAL EVERY MORNING
Status: ON HOLD | COMMUNITY
Start: 2023-02-09 | End: 2023-03-19

## 2023-03-17 RX ORDER — OLANZAPINE 5 MG/1
5 TABLET ORAL 3 TIMES DAILY PRN
Status: DISCONTINUED | OUTPATIENT
Start: 2023-03-17 | End: 2023-03-19 | Stop reason: HOSPADM

## 2023-03-17 RX ORDER — VALACYCLOVIR HYDROCHLORIDE 500 MG/1
500 TABLET, FILM COATED ORAL AT BEDTIME
Status: DISCONTINUED | OUTPATIENT
Start: 2023-03-17 | End: 2023-03-19 | Stop reason: HOSPADM

## 2023-03-17 RX ORDER — GABAPENTIN 300 MG/1
300 CAPSULE ORAL 3 TIMES DAILY PRN
Status: DISCONTINUED | OUTPATIENT
Start: 2023-03-17 | End: 2023-03-19 | Stop reason: HOSPADM

## 2023-03-17 RX ORDER — VENLAFAXINE HYDROCHLORIDE 75 MG/1
75 CAPSULE, EXTENDED RELEASE ORAL DAILY
Status: ON HOLD | COMMUNITY
End: 2023-03-17

## 2023-03-17 RX ORDER — OLANZAPINE 10 MG/2ML
10 INJECTION, POWDER, FOR SOLUTION INTRAMUSCULAR 3 TIMES DAILY PRN
Status: DISCONTINUED | OUTPATIENT
Start: 2023-03-17 | End: 2023-03-19 | Stop reason: HOSPADM

## 2023-03-17 RX ORDER — ESCITALOPRAM OXALATE 10 MG/1
10 TABLET ORAL DAILY
Status: DISCONTINUED | OUTPATIENT
Start: 2023-03-17 | End: 2023-03-17

## 2023-03-17 RX ORDER — DEXTROAMPHETAMINE SACCHARATE, AMPHETAMINE ASPARTATE, DEXTROAMPHETAMINE SULFATE AND AMPHETAMINE SULFATE 2.5; 2.5; 2.5; 2.5 MG/1; MG/1; MG/1; MG/1
10 TABLET ORAL DAILY PRN
Status: ON HOLD | COMMUNITY
Start: 2023-02-09 | End: 2023-03-19

## 2023-03-17 RX ADMIN — VALACYCLOVIR HYDROCHLORIDE 500 MG: 500 TABLET, FILM COATED ORAL at 20:24

## 2023-03-17 RX ADMIN — TRAZODONE HYDROCHLORIDE 50 MG: 50 TABLET ORAL at 20:24

## 2023-03-17 RX ADMIN — HYDROXYZINE HYDROCHLORIDE 25 MG: 25 TABLET, FILM COATED ORAL at 11:41

## 2023-03-17 RX ADMIN — ESCITALOPRAM OXALATE 10 MG: 10 TABLET ORAL at 09:15

## 2023-03-17 RX ADMIN — GABAPENTIN 300 MG: 300 CAPSULE ORAL at 14:03

## 2023-03-17 RX ADMIN — OLANZAPINE 5 MG: 5 TABLET, FILM COATED ORAL at 11:41

## 2023-03-17 ASSESSMENT — ACTIVITIES OF DAILY LIVING (ADL)
ADLS_ACUITY_SCORE: 28
ADLS_ACUITY_SCORE: 28
ADLS_ACUITY_SCORE: 45
DIFFICULTY_EATING/SWALLOWING: NO
ADLS_ACUITY_SCORE: 28
CONCENTRATING,_REMEMBERING_OR_MAKING_DECISIONS_DIFFICULTY: NO
ORAL_HYGIENE: INDEPENDENT
ADLS_ACUITY_SCORE: 28
ADLS_ACUITY_SCORE: 28
DRESS: SCRUBS (BEHAVIORAL HEALTH);INDEPENDENT
CHANGE_IN_FUNCTIONAL_STATUS_SINCE_ONSET_OF_CURRENT_ILLNESS/INJURY: NO
WEAR_GLASSES_OR_BLIND: NO
HYGIENE/GROOMING: INDEPENDENT
TOILETING_ISSUES: NO
LAUNDRY: UNABLE TO COMPLETE
FALL_HISTORY_WITHIN_LAST_SIX_MONTHS: NO
ADLS_ACUITY_SCORE: 28
DOING_ERRANDS_INDEPENDENTLY_DIFFICULTY: NO
ADLS_ACUITY_SCORE: 28
HYGIENE/GROOMING: INDEPENDENT
ADLS_ACUITY_SCORE: 28
WALKING_OR_CLIMBING_STAIRS_DIFFICULTY: NO
ADLS_ACUITY_SCORE: 28
DRESSING/BATHING_DIFFICULTY: NO
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
## 2023-03-17 NOTE — PLAN OF CARE
Problem: Adult Behavioral Health Plan of Care  Goal: Patient-Specific Goal (Individualization)  Description: Patient will sleep at least 5 hours per night.   Patient will attend at least 50% of unit programming daily.  Patient will be medication compliant.  Patient will agree to treatment team recommendations for after discharge.  3/17- pt may wean out with appropriate behavior  Outcome: Not Progressing  Note: Patient laying in bed at the start of this writer's shift.  Patient encouraged to eat dinner.    Patient eats 50% of grilled cheese.    Patient is quiet and tearful. Only responds to questions with yes or no answers.  Reminded of mom's visit at 6, and that patient would come out to the open unit for visit.    Goal Outcome Evaluation:

## 2023-03-17 NOTE — PLAN OF CARE
"  Problem: Adult Behavioral Health Plan of Care  Goal: Patient-Specific Goal (Individualization)  Description: Patient will sleep at least 5 hours per night.   Patient will attend at least 50% of unit programming daily.  Patient will be medication compliant.  Patient will agree to treatment team recommendations for after discharge.  Outcome: Not Progressing     Problem: Suicide Risk  Goal: Absence of Self-Harm  Description: Patient will be free of self harm throughout hospital stay.  Patient will agree to safety plan with staff.  Outcome: Progressing   Goal Outcome Evaluation:       Pt tearful but quiet. Asked if she could call her mom. Denied auditory hallucinations or suicidal thoughts when questioned . Medication compliant. Appetite poor- \"I didn't feel like eating\". Stated she did feel anxious but tolerable depression. Lying in bed - withdrawn and isolative.   0930- pt spoke with mom and did cry quite a bit but was able to control herself and lay back down.  1145- spoke with pt's mother- she stated pt is shy and wont ask for anything herself but pt just requested something for her anxiety. Medicated with Zyprexa 5 mg po and Atarax 25 mg po given at this time. Journal and markers brought to her. Declined to wean out or shower at this time- pt stated maybe later when her mom visits at 6 pm. Mom also stated she took \"some medication made her feel aggressive \" neither pt nor mom know the name.   1330- pt states prior medication given is ineffective for her anxiety. Dr Berman called- message left  1403- medicated with Gabapentin 300 mg po c/o anxiety  Face to face end of shift report communicated to MEHRAN Smith RN  3/17/2023  10:35 AM                    "
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
## 2023-03-17 NOTE — H&P
[facility]    History and Physical  Medical Services       Date of Admission:  3/16/2023  Date of Service (when I saw the patient): 03/17/23    Assessment & Plan     Principal Problem:    Suicidal ideation    Active Medical Problems:  Herpes simplex virus- home dose of valtrex ordered. Denies current outbreak.     Pt medically stable, no acute medical concerns. Chronic medical problems stable. Will sign off. Please consult for any new medical issues or concerns.         Code Status: Full Code    Louisa Hobbs, CNP    Primary Care Physician   Keri Kemp    Chief Complaint   Psych evaluation     History is obtained from the patient and medical chart     History of Present Illness   (per ED) Marimar Jarrett is a 23 year old individual with history of depression, anxiety, suicidal ideation, is brought in for suicidal threats. Patient states has frequent thoughts of suicide.  No did place noose around her neck but did not hang herself.  States that she is hearing voices to hang herself for the past couple of months.  Denies any alcohol or drug use other than medical marijuana.  Unknown if pregnant.    Past Medical History    I have reviewed this patient's medical history and updated it with pertinent information if needed.   Past Medical History:   Diagnosis Date     Streptococcal pharyngitis        Past Surgical History   I have reviewed this patient's surgical history and updated it with pertinent information if needed.  Past Surgical History:   Procedure Laterality Date     TONSILLECTOMY, ADENOIDECTOMY, COMBINED  8/22/2013    Procedure: COMBINED TONSILLECTOMY, ADENOIDECTOMY;  TONSILLECTOMY AND ADENOIDECTOMY;  Surgeon: Cherie Hodgson DO;  Location: [facility]     wisdom teeth  11/2021       Prior to Admission Medications   Prior to Admission Medications   Prescriptions Last Dose Informant Patient Reported? Taking?   FEROSUL 325 (65 Fe) MG tablet Past Week  No Yes   Sig: TAKE 1 TABLET BY MOUTH DAILY  WITH BREAKFAST. AVOID TAKING WITH DAIRY   Patient taking differently: Take 325 mg by mouth At Bedtime   VYVANSE 60 MG capsule Past Week  Yes Yes   Sig: Take 60 mg by mouth every morning   amphetamine-dextroamphetamine (ADDERALL) 10 MG tablet Past Week  Yes Yes   Sig: Take 10 mg by mouth daily as needed   medical cannabis (Patient's own supply) 3/16/2023  Yes Yes   Sig: Take as prescribed- vape.   prazosin (MINIPRESS) 2 MG capsule Past Week  Yes Yes   Sig: Take 2 mg by mouth At Bedtime   valACYclovir (VALTREX) 500 MG tablet Past Week  No Yes   Sig: Take 1 tablet (500 mg) by mouth daily   Patient taking differently: Take 500 mg by mouth At Bedtime      Facility-Administered Medications: None     Allergies   No Known Allergies    Social History   I have reviewed this patient's social history and updated it with pertinent information if needed. Marimar Jarrett  reports that she has never smoked. She has never used smokeless tobacco. She reports that she does not drink alcohol and does not use drugs.    Family History   I have reviewed this patient's family history and updated it with pertinent information if needed.   Family History   Problem Relation Age of Onset     Circulatory Mother      Cancer Father      Diabetes Father        Review of Systems   CONSTITUTIONAL:  negative  EYES:  negative  HEENT:  negative  RESPIRATORY:  negative  CARDIOVASCULAR:  negative  GASTROINTESTINAL:  negative  GENITOURINARY:  negative  INTEGUMENT/BREAST:  negative  HEMATOLOGIC/LYMPHATIC:  negative  ALLERGIC/IMMUNOLOGIC:  negative  ENDOCRINE:  negative  MUSCULOSKELETAL:  negative  NEUROLOGICAL:  negative    Physical Exam   Temp: 97.9  F (36.6  C) Temp src: Temporal BP: 110/67 Pulse: 93   Resp: 14 SpO2: (!) 14 % O2 Device: None (Room air)    Vital Signs with Ranges  Temp:  [97.6  F (36.4  C)-98.4  F (36.9  C)] 97.9  F (36.6  C)  Pulse:  [] 93  Resp:  [14-22] 14  BP: (110-147)/(67-92) 110/67  SpO2:  [14 %-99 %] 14 %  111 lbs 12.8  oz    Constitutional: awake, alert, cooperative, no apparent distress, and appears stated age, vitals stable   Eyes: Lids and lashes normal, pupils equal, round and reactive to light, extra ocular muscles intact, sclera clear, conjunctiva normal  ENT: Normocephalic, without obvious abnormality, atraumatic, external ears without lesions, oral pharynx with moist mucous membranes, no erythema or exudates  Hematologic / Lymphatic: no cervical lymphadenopathy  Respiratory: No increased work of breathing, good air exchange, clear to auscultation bilaterally, no crackles or wheezing  Cardiovascular: Normal apical impulse, regular rate and rhythm, normal S1 and S2, no S3 or S4, and no murmur noted  GI: normal bowel sounds, soft, non-distended, non-tender, no masses palpated, no hepatosplenomegally  Genitounirinary: deferred  Skin: normal skin color, texture, turgor, no redness, warmth, or swelling and no rashes  Musculoskeletal: There is no redness, warmth, or swelling of the joints.  Full range of motion noted.    Neurologic: Awake, alert, oriented to name, place and time.    Neuropsychiatric: General: anxious, tearful, and fair eye contact    Data   Data reviewed today:   No lab results found in last 7 days.    No results found for this or any previous visit (from the past 24 hour(s)).

## 2023-03-17 NOTE — CARE PLAN
Prior to Admission Medication Reconciliation:     Medications added:   [] None  [x] As listed below:    vyvanse    adderall        Medications deleted:   [] None  [x] As listed below:    isibloom- pt stopped independently, reports she experienced negative side effects to her mental health from taking. Reported she was started on to initiate her menstrual cycle but it didn't work.     effexor 75 mg- pt stopped independently, reports it was not working and she experienced no therapeutic benefit from taking.     Medications marked for review/removal by attending:  [x] None  [] As listed below:    Changes made to existing medications:   [] None  [x] Updated time of day, strengths and frequencies to most current.     all    Pertinent notes/medications patient takes different than prescribed:     Takes valtrex and iron at HS    Ran out of adderall and vyvanse a couple of days ago and was waiting on refills. Takes both every day.     Prazosin- pt reports she takes, but does not find effective.     Last times/dates taken verified with patient:  [x] Yes- completed myself   [] Nurse completed, no changes made (double checked entries)  [] Unable to review with patient at this time:    Allergy review:    []Did not review: reviewed by nursing  [x]Allergies reviewed and updated if needed.     Medication reconciliation sources:   [x]Patient  []Patient family member/emergency contact: **  [x][facility] Report Review  [x]Epic Chart Review  []Care Everywhere review  []Pharmacy med list/phone call: **  []Fill dates reflect compliancy. No concerns.  []Fill dates do not reflect compliancy on the following medications:   [x]Outside meds dispense report:   []Fill dates reflect compliancy. No concerns.  [x]Fill dates do not reflect compliancy on the following medications:     Vyvanse, adderall- ran out    effexor and isibloom- stopped taking  []HomeCare medlist, Nursing home or Assisted Living MAR:  [x]Behavioral Health Provider: VALERIE  Med-Samreenyobany Saavedra    Last seen 1/12/23    adderall 10 mg 1 tab daily as needed    vyvanse 60 mg daily     Prazosin 2 mg at bedtime     effexor 75 mg capsule daily with food    Medical cannabis- suggested to register for.   []Other: **    Pharmacy desired at discharge: [facility]    Is patient on coumadin?   [x]No  []Yes      Fill dates and reported compliancy:  [x] Compliant: fill dates reflect reported compliancy.   [] Not applicable. Patient is not taking any prescribed maintenance medications at this time.   [] Fill dates do not coincide with compliancy, but reports compliancy.    [] Fill dates reflect compliancy, but reports non-compliancy.   [] Cannot assess at this time.     Historian accuracy:  [] Excellent- alert and oriented, understands why meds were prescribed and how to take, able to answer specifics  [x] Good- alert and oriented, understands why meds were prescribed and how to take, some confusion   [] Fair- alert and oriented, doesn't know medications without list, cannot answer specifics about medications, but has a decent process for which to take at home  [] Poor- does not know medications, may not have a process to take at home, may be cognitively unable to review at this time  []Medication management done by family member or facility, no concerns about historian accuracy.   [] Cannot assess at this time.     Medication Management:  [x] Manages meds independently  [] Family member/ other party manages meds/assists:  [] Meds managed by staff at facility  [] Meds set up by home care, family/other party helps administer  [] Meds set up by home care, self administers  [] Cannot assess at this time.     Other medications aside from PTA:  [x] Denies taking any other medications aside from those listed in PTA meds, this includes over-the-counter vitamins, supplements and analgesics.   [] Unable to confirm at this time.     Megan Hassan on 3/17/2023 at 8:12 AM     Notifying appropriate party of  changes/additions/discrepancies:  [x]No pertinent changes made, notification not necessary.   [] Notified attending provider via text page/phone call/sticky note or other:  [] Notified other:  [] Medications have not been reconciled by a provider yet, notification not necessary  [] Pt is not admitted to floor yet or patient is boarding, PTA meds completed before admission.     Medications Prior to Admission   Medication Sig Dispense Refill Last Dose     amphetamine-dextroamphetamine (ADDERALL) 10 MG tablet Take 10 mg by mouth daily as needed   Past Week     FEROSUL 325 (65 Fe) MG tablet TAKE 1 TABLET BY MOUTH DAILY WITH BREAKFAST. AVOID TAKING WITH DAIRY (Patient taking differently: Take 325 mg by mouth At Bedtime) 30 tablet 4 Past Week     medical cannabis (Patient's own supply) Take as prescribed- vape.   3/16/2023     prazosin (MINIPRESS) 2 MG capsule Take 2 mg by mouth At Bedtime   Past Week     valACYclovir (VALTREX) 500 MG tablet Take 1 tablet (500 mg) by mouth daily (Patient taking differently: Take 500 mg by mouth At Bedtime) 30 tablet 11 Past Week     VYVANSE 60 MG capsule Take 60 mg by mouth every morning   Past Week

## 2023-03-17 NOTE — ED PROVIDER NOTES
EMERGENCY MEDICINE NOTE - BRADLEY MARKS, CNP    ID:   Marimar Jarrett    CC:  Chief Complaint   Patient presents with     Suicidal        MEANS OF ARRIVAL:  private car    PCP:   Keri Kemp    SUBJECTIVE:   HPI:   Marimar Jarrett is a 23 year old individual with history of depression, anxiety, suicidal ideation, is brought in for suicidal threats.   Patient states has frequent thoughts of suicide.  No did place noose around her neck but did not hang herself.  States that she is hearing voices to hang herself for the past couple of months.  Denies any alcohol or drug use other than medical marijuana.  Unknown if pregnant.    Review of Systems:  Significant positives/negatives were reviewed and mentioned in HPI.  All remaining body systems are negative or non-contributory.    I have reviewed the PMH, Meds, Allergies, and SH, including:  ALLERGIES:  No Known Allergies    CURRENT MEDICATIONS:  Current Outpatient Rx   Medication Sig Dispense Refill     lisdexamfetamine (VYVANSE) 40 MG capsule Take 1 capsule (40 mg) by mouth every morning (Patient taking differently: Take 40 mg by mouth every morning Pt takes 70 mg daily) 30 capsule 0     medical cannabis (Patient's own supply) 1 Dose See Admin Instructions (The purpose of this order is to document that the patient reports taking medical cannabis.  This is not a prescription, and is not used to certify that the patient has a qualifying medical condition.)       prazosin (MINIPRESS) 2 MG capsule        valACYclovir (VALTREX) 500 MG tablet Take 1 tablet (500 mg) by mouth daily 30 tablet 11     desogestrel-ethinyl estradiol (ISIBLOOM) 0.15-30 MG-MCG tablet Take 1 tablet by mouth daily (Patient not taking: Reported on 3/16/2023) 28 tablet 11     FEROSUL 325 (65 Fe) MG tablet TAKE 1 TABLET BY MOUTH DAILY WITH BREAKFAST. AVOID TAKING WITH DAIRY (Patient not taking: Reported on 3/16/2023) 30 tablet 4        PMH:  Patient Active Problem List   Diagnosis      Adenotonsillar hypertrophy     Recurrent cold sores     Anxiety     Other depression     Suicidal ideation     Past Surgical History:   Procedure Laterality Date     TONSILLECTOMY, ADENOIDECTOMY, COMBINED  8/22/2013    Procedure: COMBINED TONSILLECTOMY, ADENOIDECTOMY;  TONSILLECTOMY AND ADENOIDECTOMY;  Surgeon: Cherie Hodgson DO;  Location: [facility]     wisdom teeth  11/2021     Social History     Tobacco Use     Smoking status: Never     Smokeless tobacco: Never   Vaping Use     Vaping Use: Never used   Substance Use Topics     Alcohol use: No     Drug use: No       OBJECTIVE:     Vitals:    03/16/23 1948   BP: 134/92   Pulse: 110   Resp: 22   Temp: 98.2  F (36.8  C)   TempSrc: Oral   SpO2: 99%     There is no height or weight on file to calculate BMI.  GENERAL APPEARANCE:  The patient is a 23 year old well-developed, well-nourished individual who is crying and tearful upon arrival.  NECK:  Supple.  Trachea is midline.    LUNGS:  Breathing is easy.  Breath sounds are equal and clear bilaterally.  No wheezes, rhonchi, or rales.  HEART:  Regular rate and rhythm with normal S1 and S2.  No murmurs, gallops, or rubs.  ABDOMEN:  Soft, flat, and benign.  No mass, tenderness, guarding, or rebound.    NEUROLOGIC:  No focal sensory or motor deficits are noted.    PSYCHIATRIC:   Gait and Station: Normaling: 10 or higher- significant anxiety; 15 or higher- severe anxiety  Psychomotor Behavior:  no evidence of tardive dyskinesia, dystonia, or tics  Attention Span and Concentration:  intact  Eye Contact:  poor   Speech:  clear, coherent  Mood:  depressed  Affect:  mood congruent  Thought Process:  logical and linear  Thought Content:  active suicidal ideation present  Oriented to:  time, person, and place  Recent and Remote Memory:  intact  Judgment:  poor  Attitude:  cooperative  Insight:  Not assessed  SKIN:  Warm, dry, and well perfused.  Good turgor.  No lesions, nodules, or rashes are noted.  No bruising noted.     Comment:  Discrepancies between my note and notes on behalf of the nursing team or other care providers are secondary to my findings reflecting my physical examination and questioning of the patient.  Any conflicting information provided is not in line with my examination of the patient.    LAB:     Recent Results (from the past 24 hour(s))   HCG qualitative urine (UPT)    Collection Time: 03/16/23  8:30 PM   Result Value Ref Range    hCG Urine Qualitative Negative Negative   Urine Drugs of Abuse Screen Panel 13    Collection Time: 03/16/23  8:30 PM   Result Value Ref Range    Cannabinoids (20-xqv-1-carboxy-9-THC) Detected (A) Not Detected, Indeterminate    Phencyclidine Not Detected Not Detected, Indeterminate    Cocaine (Benzoylecgonine) Not Detected Not Detected, Indeterminate    Methamphetamine (d-Methamphetamine) Not Detected Not Detected, Indeterminate    Opiates (Morphine) Not Detected Not Detected, Indeterminate    Amphetamine (d-Amphetamine) Not Detected Not Detected, Indeterminate    Benzodiazepines (Nordiazepam) Detected (A) Not Detected, Indeterminate    Tricyclic Antidepressants (Desipramine) Not Detected Not Detected, Indeterminate    Methadone Not Detected Not Detected, Indeterminate    Barbiturates (Butalbital) Not Detected Not Detected, Indeterminate    Oxycodone Not Detected Not Detected, Indeterminate    Propoxyphene (Norpropoxyphene) Not Detected Not Detected, Indeterminate    Buprenorphine Not Detected Not Detected, Indeterminate   Asymptomatic COVID-19 Virus (Coronavirus) by PCR Nasopharyngeal    Collection Time: 03/16/23  9:01 PM    Specimen: Nasopharyngeal; Swab   Result Value Ref Range    SARS CoV2 PCR Negative Negative   Salicylate level    Collection Time: 03/16/23  9:13 PM   Result Value Ref Range    Salicylate <0.3   mg/dL   Acetaminophen level    Collection Time: 03/16/23  9:13 PM   Result Value Ref Range    Acetaminophen <5.0 (L) 10.0 - 30.0 ug/mL   Alcohol ethyl    Collection Time: 03/16/23   9:13 PM   Result Value Ref Range    Alcohol ethyl <0.01 <=0.01 g/dL       ED COURSE/ MDM/ ASSESSMENT/ PLAN:   Diagnostic Testing:  Diagnostic testing was conducted.  Negative pregnancy, COVID, salicylate, acetaminophen, alcohol.  Cannabinoids and benzodiazepines noted in drug screen.    Assessment:   Patient presents to the ER today suicidal thoughts..  Upon arrival, vitals signs show blood pressure 134/92 with a pulse of 110.  Temperature 36.8  C.  Respirations 20 with oxygenation of 99% on room air.  Examination was completed.  The patient is alert but very tearful.  Patient does have suicidal thoughts.  Patient is calm and cooperative at this time.  Stated hallucinations but not acting on them at this time.    Potential diagnosis which have been considered and evaluated include electrolyte imbalance, psychosis, suicidal ideation, as well as others. Many of these have been excluded using the various modalities and assessment as noted on the chart. At the present time, the diagnosis given seems to be the most likely suicidal ideation.    ED Course/MDM/Plan:   Patient comes in with thoughts of suicide and did have cord around her neck but did not go through with killing herself.  Comes in for evaluation of this.  One-to-one sitter placed on patient.  Health officer hold placed.  Patient is tearful and cooperative upon arrival.  States that she does have thoughts of suicide.  States that she does hear voices telling her to hang herself.  Patient is otherwise negative physical exam.  Lab work does reveal cannabinoids and benzodiazepines in the system.  No other acute abnormalities in lab work.  [facility] who accept patient for admit.  Patient admitted to Dr. Dillard.      DIAGNOSIS:   (R45.851) Suicidal ideation      Critical Care Time: None    Impression and plan discussed with patient. Questions answered, concerns addressed, indications for urgent re-evaluation reviewed, and  given.  Patient/Parent/Caregiver agree with treatment plan and have no further questions at this time.      This note was created by the Dragon Voice Dictation System. Inadvertent typographical errors, due to software recognition problems, may still exist.      Ziyad MARKS, CNP  [facility]  EMERGENCY DEPARTMENT  [address] [place] [id number]  [phone]       Ziyad Washington APRN CNP  03/16/23 6623

## 2023-03-17 NOTE — ED TRIAGE NOTES
Hearing voices that are telling here to die. Wants to hang herself. Tearful and apologetic. Mother with her. Mother had given her a klonopin that was mothers per scription. Patient requesting help

## 2023-03-17 NOTE — DISCHARGE INSTRUCTIONS
Behavioral Discharge Planning and Instructions    Summary: Pt arrived to our ED with increased depression and SI.     Main Diagnosis:     Health Care Follow-up:     [facility] management- Laura: Wednesday March 22nd    [address] [place] [address]  Phone: [phone]  Fax: [phone]  [email or web address]      Attend all scheduled appointments with your outpatient providers. Call at least 24 hours in advance if you need to reschedule an appointment to ensure continued access to your outpatient providers.     Major Treatments, Procedures and Findings:  You were provided with: a psychiatric assessment, assessed for medical stability, medication evaluation and/or management, group therapy, family therapy, individual therapy, CD evaluation/assessment, milieu management, and medical interventions    Symptoms to Report: feeling more aggressive, increased confusion, losing more sleep, mood getting worse, or thoughts of suicide    Early warning signs can include: increased depression or anxiety sleep disturbances increased thoughts or behaviors of suicide or self-harm  increased unusual thinking, such as paranoia or hearing voices    Safety and Wellness:  Take all medicines as directed.  Make no changes unless your doctor suggests them.      Follow treatment recommendations.  Refrain from alcohol and non-prescribed drugs.  Ask your support system to help you reduce your access to items that could harm yourself or others. Items could include:  Firearms  Medicines (both prescribed and over-the-counter)  Knives and other sharp objects  Ropes and like materials  Car keys  If there is a concern for safety, call 911. If there is a concern for safety, call 911.    Resources:   Crisis Intervention: 666.583.3449 or 785-365-5198 (TTY: 551.390.3677).  Call anytime for help.  National [place] on Mental Illness (www.[place].ashley.org): 949.726.5594 or 470-756-0915.  Alcoholics Anonymous (www.alcoholics-anonymous.org): Check  "your phone book for your local chapter.  Suicide Awareness Voices of Education (SAVE) (www.save.org): 993-098-BDLY (4202)  National Suicide Prevention Line (www.mentalhealthmn.org): 087-005-UFKZ (1981)  Mental Health Consumer/Survivor Network of [place] (www.mhcsn.net): 672.111.6166 or 319-891-2270  Mental Health Association of [place] (www.mentalhealth.org): 397.570.3808 or 142-972-8795  Self- Management and Recovery Training., SMART-- Toll free: 224.857.2377  www.Fresh !  Text 4 Life: txt \"LIFE\" to 82965 for immediate support and crisis intervention  Crisis text line: Text \"[place]\" to 266325. Free, confidential, 24/7.  Crisis Intervention: 984.240.9516 or 357-443-0836. Call anytime for help.     General Medication Instructions:   See your medication sheet(s) for instructions.   Take all medicines as directed.  Make no changes unless your doctor suggests them.   Go to all your doctor visits.  Be sure to have all your required lab tests. This way, your medicines can be refilled on time.  Do not use any drugs not prescribed by your doctor.  Avoid alcohol.    Advance Directives:   Scanned document on file with [facility]? No scanned doc  Is document scanned? Pt states no documents  Honoring Choices Your Rights Handout: Informed and given  Was more information offered? Pt declined    The Treatment team has appreciated the opportunity to work with you. If you have any questions or concerns about your recent admission, you can contact the unit which can receive your call 24 hours a day, 7 days a week. They will be able to get in touch with a Provider if needed. The unit number is [phone] .  "

## 2023-03-17 NOTE — PROGRESS NOTES
03/17/23 0011   Patient Belongings   Did you bring any home meds/supplements to the hospital?  No   Patient Belongings remains with patient  (jacket,boots,3xj.pants,2xhoodie,bra,socks,2xcharger,beatsheadphonein case,case an  personal items,p.j. outfit,p.j.pant,tshirt,book,crayola)   Patient Belongings Remaining with Patient clothing   Belongings Search Yes   Clothing Search Yes   Second Staff shin   Comment n/a     List items sent to safe:iphone in case,ipad incase. Person has two belongings envelopes. All other belongings put in assigned cubby in belongings room.       I have reviewed my belongings list on admission and verify that it is correct.     Patient signature_______________________________    Second staff witness (if patient unable to sign) ______________________________       I have received all my belongings at discharge.    Patient signature________________________________    Olinda France CNA  3/17/2023  12:14 AM

## 2023-03-18 PROCEDURE — 250N000013 HC RX MED GY IP 250 OP 250 PS 637: Performed by: PSYCHIATRY & NEUROLOGY

## 2023-03-18 PROCEDURE — 99233 SBSQ HOSP IP/OBS HIGH 50: CPT | Performed by: NURSE PRACTITIONER

## 2023-03-18 PROCEDURE — 124N000001 HC R&B MH

## 2023-03-18 PROCEDURE — 250N000013 HC RX MED GY IP 250 OP 250 PS 637: Performed by: NURSE PRACTITIONER

## 2023-03-18 RX ORDER — FLUOXETINE 10 MG/1
10 CAPSULE ORAL DAILY
Status: DISCONTINUED | OUTPATIENT
Start: 2023-03-18 | End: 2023-03-19 | Stop reason: HOSPADM

## 2023-03-18 RX ADMIN — TRAZODONE HYDROCHLORIDE 50 MG: 50 TABLET ORAL at 20:32

## 2023-03-18 RX ADMIN — OLANZAPINE 5 MG: 5 TABLET, FILM COATED ORAL at 21:46

## 2023-03-18 RX ADMIN — OLANZAPINE 5 MG: 5 TABLET, FILM COATED ORAL at 10:10

## 2023-03-18 RX ADMIN — VALACYCLOVIR HYDROCHLORIDE 500 MG: 500 TABLET, FILM COATED ORAL at 20:32

## 2023-03-18 RX ADMIN — FLUOXETINE 10 MG: 10 CAPSULE ORAL at 13:36

## 2023-03-18 ASSESSMENT — ACTIVITIES OF DAILY LIVING (ADL)
ADLS_ACUITY_SCORE: 28
ADLS_ACUITY_SCORE: 28
ORAL_HYGIENE: INDEPENDENT
ADLS_ACUITY_SCORE: 28
ADLS_ACUITY_SCORE: 28
HYGIENE/GROOMING: INDEPENDENT
LAUNDRY: UNABLE TO COMPLETE
ADLS_ACUITY_SCORE: 28
DRESS: SCRUBS (BEHAVIORAL HEALTH);INDEPENDENT
ADLS_ACUITY_SCORE: 28

## 2023-03-18 NOTE — PLAN OF CARE
Problem: Adult Behavioral Health Plan of Care  Goal: Patient-Specific Goal (Individualization)  Description: Patient will sleep at least 5 hours per night.   Patient will attend at least 50% of unit programming daily.  Patient will be medication compliant.  Patient will agree to treatment team recommendations for after discharge.  3/17- pt may wean out with appropriate behavior  Outcome: Progressing  Note: Report received from Chloé. Rounding complete. Pt observed sleeping in left side lying position with regular and unlabored respirations.    Pt has been in bed with eyes closed and regular respirations. 15 minute and PRN checks all night. No complaints offered. Will continue to monitor.    Pt slept approx  5  hours this NOC shift.    Face to face end of shift report communicated to oncoming RN.    Betty FRANCIS RN  March 18, 2023  4:01 AM          Problem: Suicide Risk  Goal: Absence of Self-Harm  Description: Patient will be free of self harm throughout hospital stay.  Patient will agree to safety plan with staff.  Outcome: Progressing  Note: Unable to assess due to pt sleeping. Pt has remained free of self-harm.     Goal Outcome Evaluation:

## 2023-03-18 NOTE — PLAN OF CARE
"Face to face end of shift report communicated to oncoming shift.     Chloé Posadas RN  3/17/2023  11:02 PM        Problem: Adult Behavioral Health Plan of Care  Goal: Patient-Specific Goal (Individualization)  Description: Patient will sleep at least 5 hours per night.   Patient will attend at least 50% of unit programming daily.  Patient will be medication compliant.  Patient will agree to treatment team recommendations for after discharge.  3/17- pt may wean out with appropriate behavior  3/17/2023 2046 by Chloé Posadas, RN  Outcome: Not Progressing  Note:   Patient remains isolated to room.  Patient laying in bed at the start of this writer's shift.  Patient encouraged to eat dinner.    Patient eats few bites of grilled cheese.    Patient is quiet and tearful. Only responds to questions with yes or no answers. Or \"it's ok\"   Reminded of mom's visit at 6, and that patient would come out to the open unit for visit.   Patient's mother visits this shift.  Highly involved with patient's care.  Requests to speak with this writer.  Goes on about how can she be off the medication that is helping her and expect her to get better.  Explained to mother that she's in the care of a doctor at the facility and they are taking care of her.  Patient is unable to speak or give her opinion as mother interjects with each comment.    Patient does however state the antidepressant she was on in the past \"made me feel more aggressive\"  Informed patient will update provider of this.      Mother calls the unit two more times this shift, requesting to speak with her daughter.  Daughter declines wanting to talk to her mother at this time.      Patient does express \"extreme social anxiety\"  \"I have a very hard time out in the other area around people and some of the people out there scare me\"  Endorses fear that this will increase her stay if she does not want to be out there due to feeling uncomfortable.  Informed " patient it is too soon to tell what will or will not impact her stay, staff is doing the best to help her and will go day by day.   Encouraged patient to eat snack as she has not eaten today.  Patient eats cheese stick and drinks half a glass of juice.     Patient takes scheduled medications without issue. Informed patient that the Lexapro has been discontinued and she would be starting another medication tomorrow.  Patient nods head in agreement.        Goal Outcome Evaluation:

## 2023-03-18 NOTE — PROGRESS NOTES
"Patient states \"I feel like I'm about to have a panic attack\"; Requested & received 5 mg Zyprexa at 1010.    "

## 2023-03-18 NOTE — PROGRESS NOTES
"[facility] PSYCHIATRY  PROGRESS NOTE     SUBJECTIVE   Nursing report she went into panic mode in the [facility] earlier this morning and she accepted Zyprexa with good result.    During interview, patient reports \"doing good, just sad because I miss my mom and my cat\".  She reports sleeping good last night after taking trazodone and has been attending groups today.  She does report continued depression and anxiety and is encouraged to utilize hydroxyzine or gabapentin today if feeling more anxious.  Patient also consents to trial prozac today.  She also endorses paranoia PTA, thinking people were looking in the windows - she denies feeling paranoia on the unit and feels safe.  At the same time, she is now wondering about discharge as she would like to leave today and stay with her mom.  Inform patient that I am not quite comfortable yet about her leaving as not much has changed yet, and I bring up the fact that she had a rope around her neck PTA.  She corrects me as it was a \" cord\" and does not seem to grasp the significance of this act in general.  Patient does say to her credit that she called her mom right away and reports she \"knew she would not go through with it\".  She does agree to start the medication today and we will talk again tomorrow - I would like her to at least stay until Monday when the treatment team can convene.         MEDICATIONS   Scheduled Meds:    traZODone  50 mg Oral At Bedtime     valACYclovir  500 mg Oral At Bedtime     PRN Meds:.acetaminophen, alum & mag hydroxide-simethicone, gabapentin, hydrOXYzine, melatonin, nicotine, OLANZapine **OR** OLANZapine     ALLERGIES   No Known Allergies     MENTAL STATUS EXAM   Vitals: /91   Pulse 77   Temp 98  F (36.7  C) (Tympanic)   Resp 16   Ht 1.524 m (5')   Wt 50.7 kg (111 lb 12.8 oz)   SpO2 98%   BMI 21.83 kg/m      Appearance:  awake, alert, adequately groomed and appeared younger than stated age  Attitude:  cooperative  Eye " Contact:  fair  Mood:  anxious, sad  and depressed  Affect:  mood congruent  Speech:  clear, coherent  Psychomotor Behavior:  no evidence of tardive dyskinesia, dystonia, or tics  Thought Process:  goal oriented  Associations:  no loose associations  Thought Content:  no evidence of suicidal ideation or homicidal ideation and no evidence of psychotic thought  Insight:  limited  Judgment:  limited  Oriented to:  time, person, and place  Attention Span and Concentration:  intact  Recent and Remote Memory:  intact  Language: Able to name objects, Able to repeat phrases and Able to read and write  Fund of Knowledge: appropriate  Muscle Strength and Tone: normal  Gait and Station: Normal       LABS   No results found for this or any previous visit (from the past 24 hour(s)).      IMPRESSION     Summary: Marimar Jarrett is a 23 year old female with a past psychiatric history notable for depression, anxiety, PTSD and medical cannabis use. Currently sees provider through [facility] and is currently prescribed Vyvanse, prazosin and medical cannabis. No prior inpatient psychiatric hospitalizations. Reports no trials of selective serotonin reuptake inhibitor/SNRI.  Presents to hospital in setting of worsening depressive symptoms and increasing frequency and intensity of suicidal ideation. Reportedly tied a rope around her neck several days prior to admission. Reports of paranoia and AH. Presents on a voluntary basis. Patient was subsequently transferred and accepted for inpatient psychiatric hospitalization at [facility] for further safety and further stabilization      Etiology of patient's presentation today is consistent with MDD with psychotic features. Will start escitalopram.  WIll make PRN olanzpaine available if needed for paranoia. Will start trazodone for sleep. Will not resume vyvanse as she is psychotic and will not restart prazosin as she does not find it helpful. receommend she  stop medical cannabis and this is likely contributing to worsening psychotic symptoms.        DIAGNOSES   Major Depressive Disorder, recurrent, severe with psychosis  PTSD  Cannabis Use       PLAN     Location: Unit 5  Legal Status: Orders Placed This Encounter      Health Officer Authority to Detain ([place])      Voluntary    Safety Assessment:    Behavioral Orders   Procedures     Code 1 - Restrict to Unit     Routine Programming     As clinically indicated     Status 15     Every 15 minutes.      Imminent risk of harm in a setting less restrictive than an inpatient psychiatric facility is determined to be medium to high.     PTA psychotropic medications stopped:     - Held both Vyvanse and prazosin    PTA psychotropic medications continued/changed:     - none    New medications tried and stopped:     -escitalopram 10mg - stopped    New medications initiated:     -Start prozac 3/18  -Continue trazodone  -Continue Unit PRNs including gabapentin and hydroxyzine    Today's Changes:    - starting prozac 10mg daily    Programming: Patient will be treated in a therapeutic milieu with appropriate individual and group therapies. Education will be provided on diagnoses, medications, and treatments.     Medical diagnoses:  Per medicine    Consult: None  Tests: None    Anticipated LOS: 1-2 days  Disposition: To stay with mom       TREATMENT TEAM CARE PLAN     Progress: Continued symptoms.    Continued Stay Criteria/Rationale: Continued symptoms without sufficient improvement/resolution.    Medical/Physical: See above.    Precautions: See above.     Plan: Continue inpatient care with unit support and medication management.    Rationale for change in precautions or plan: NA due to no change.    Participants: KENDRICK Soto CNP, Nursing, SW, OT.    The patient's care was discussed with the treatment team and chart notes were reviewed.       ATTESTATION      KENDRICK Soto CNP

## 2023-03-18 NOTE — PLAN OF CARE
"PT denies SI this shift. She reports she regrets the decision she made and will never do that again.  She was agreeable with provider to start prozac.   She presented with less insight to mental health and the seriousness of her admission here. Reporting, well it \"was just a phone cord\" and \"I would really like to leave as I miss my cat.\"  PT was tearful and reporting \"I think I am having an anxiety attack\" and was agreeable to take 5mg Zyprexa which she reports was effective.   PT attending groups but otherwise presents as paranoid and withdrawn, anxious and goes back into room.   Mom called to encourage discharge of patient to her care. Updated mom that provider would like to wait to see medications responses and to ensure  That her follow up appointments are in place as well as set up prescriptions to be sent out assuming that the new medications are effective vs sending her to her moms on new medications before trying them here.    Face to face end of shift report communicated to oncoming RN     Ree Wolfe RN  3/18/2023  3:48 PM                   Problem: Adult Behavioral Health Plan of Care  Goal: Patient-Specific Goal (Individualization)  Description: Patient will sleep at least 5 hours per night.   Patient will attend at least 50% of unit programming daily.  Patient will be medication compliant.  Patient will agree to treatment team recommendations for after discharge.  3/18- pt may wean out with appropriate behavior  Outcome: Progressing     Problem: Suicide Risk  Goal: Absence of Self-Harm  Description: Patient will be free of self harm throughout hospital stay.  Patient will agree to safety plan with staff.  Outcome: Progressing   Goal Outcome Evaluation:                        "

## 2023-03-19 VITALS
HEIGHT: 60 IN | SYSTOLIC BLOOD PRESSURE: 122 MMHG | HEART RATE: 67 BPM | WEIGHT: 111.8 LBS | OXYGEN SATURATION: 98 % | TEMPERATURE: 97.8 F | BODY MASS INDEX: 21.95 KG/M2 | DIASTOLIC BLOOD PRESSURE: 59 MMHG | RESPIRATION RATE: 16 BRPM

## 2023-03-19 PROCEDURE — 250N000013 HC RX MED GY IP 250 OP 250 PS 637: Performed by: NURSE PRACTITIONER

## 2023-03-19 PROCEDURE — 99239 HOSP IP/OBS DSCHRG MGMT >30: CPT | Performed by: NURSE PRACTITIONER

## 2023-03-19 RX ORDER — OLANZAPINE 5 MG/1
5 TABLET ORAL DAILY PRN
Qty: 10 TABLET | Refills: 0 | Status: SHIPPED | OUTPATIENT
Start: 2023-03-19

## 2023-03-19 RX ORDER — FLUOXETINE 10 MG/1
10 CAPSULE ORAL DAILY
Qty: 30 CAPSULE | Refills: 0 | Status: SHIPPED | OUTPATIENT
Start: 2023-03-20

## 2023-03-19 RX ORDER — TRAZODONE HYDROCHLORIDE 50 MG/1
50 TABLET, FILM COATED ORAL
Qty: 10 TABLET | Refills: 0 | Status: SHIPPED | OUTPATIENT
Start: 2023-03-19

## 2023-03-19 RX ADMIN — FLUOXETINE 10 MG: 10 CAPSULE ORAL at 09:23

## 2023-03-19 ASSESSMENT — ACTIVITIES OF DAILY LIVING (ADL)
ADLS_ACUITY_SCORE: 28
ORAL_HYGIENE: INDEPENDENT
DRESS: SCRUBS (BEHAVIORAL HEALTH);INDEPENDENT
ADLS_ACUITY_SCORE: 28
ADLS_ACUITY_SCORE: 28
HYGIENE/GROOMING: INDEPENDENT
ADLS_ACUITY_SCORE: 28
ADLS_ACUITY_SCORE: 28
LAUNDRY: UNABLE TO COMPLETE
ADLS_ACUITY_SCORE: 28

## 2023-03-19 NOTE — PLAN OF CARE
Discharge Note    Patient Discharged to home on 3/19/2023 10:58 AM via Private Car accompanied by facility staff     Patient informed of discharge instructions in AVS. patient verbalizes understanding and denies having any questions pertaining to AVS. Patient stable at time of discharge. Patient denies SI, HI, and thoughts of self harm at time of discharge. All personal belongings returned to patient. Discharge prescriptions sent to [facility]  via electronic communication.     Ree Wolfe RN  3/19/2023            Problem: Adult Behavioral Health Plan of Care  Goal: Patient-Specific Goal (Individualization)  Description: Patient will sleep at least 5 hours per night.   Patient will attend at least 50% of unit programming daily.  Patient will be medication compliant.  Patient will agree to treatment team recommendations for after discharge.  3/17- pt may wean out with appropriate behavior  Outcome: Progressing     Problem: Adult Behavioral Health Plan of Care  Goal: Plan of Care Review  Recent Flowsheet Documentation  Taken 3/19/2023 0900 by Ree Wolfe, RN  Patient Agreement with Plan of Care: agrees   Goal Outcome Evaluation:    Plan of Care Reviewed With: patient

## 2023-03-19 NOTE — PLAN OF CARE
Problem: Adult Behavioral Health Plan of Care  Goal: Plan of Care Review  Outcome: Progressing  Note: Report received from Chloé. Rounding complete. Pt observed sleeping in left side lying position with regular and unlabored respirations.    Pt has been in bed with eyes closed and regular respirations. 15 minute and PRN checks all night. No complaints offered. Will continue to monitor.    Pt slept approx  7.5  hours this NOC shift.    Face to face end of shift report communicated to oncoming RN.    Betty FRANCIS RN  March 19, 2023  4:24 AM   '     Problem: Suicide Risk  Goal: Absence of Self-Harm  Description: Patient will be free of self harm throughout hospital stay.  Patient will agree to safety plan with staff.  Outcome: Progressing  Note: Unable to assess due to pt sleeping. Pt has remained free of self-harm.     Goal Outcome Evaluation:
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
## 2023-03-19 NOTE — PLAN OF CARE
"Face to face end of shift report communicated to oncoming shift.     Chloé Posadas RN  3/18/2023  11:24 PM        Problem: Adult Behavioral Health Plan of Care  Goal: Patient-Specific Goal (Individualization)  Description: Patient will sleep at least 5 hours per night.   Patient will attend at least 50% of unit programming daily.  Patient will be medication compliant.  Patient will agree to treatment team recommendations for after discharge.  3/17- pt may wean out with appropriate behavior  Outcome: Progressing  Note: Patient up on open unit sitting in lounge at the start of this writer's shift.  Patient is quiet and keeps to self, does not socialize with peers.  Minimal conversation with staff.  When asked patient how she is feeling today.  \"I'm good the new pills are working, and all the thoughts are gone today, I haven't had them at all\"  Patient eats dinner in lounge with peers, patient eats 100%.    Patient's mom visits this shift, continuing to ask if patient is on a 72 hour hold, informed patient and mother she is not.      Patient takes medications as scheduled, reports \"my mind is feeling a bit like it's racing and I wonder if I'm going to have a hard time falling asleep\"  Informed patient that if she has difficulty this writer can bring her something for that, reports the trazodone was helpful last night.     Patient has follow-up appt. Set for Wed. 22nd @ 1000 with [facility]. Patient's mother is much less demanding with patient's care this shift.  Mother requesting phone call from provider.  Reporting that the medication patient received earlier today really helped patient, medication was Zyprexa.     Patient requests Zyprexa for sleep/anxiety.  PRN:  Zyprexa 5 mg given @ 2146  Patient knocks on ICU door to let needs be known.    Goal Outcome Evaluation:                        "

## 2023-03-20 ENCOUNTER — TELEPHONE (OUTPATIENT)
Dept: BEHAVIORAL HEALTH | Facility: HOSPITAL | Age: 23
End: 2023-03-20

## 2023-03-20 NOTE — TELEPHONE ENCOUNTER
She was discharged to home on  3/19/23 from [facility]. I spoke today with her regarding the patient's discharge.    She indicates she has received sufficient information upon discharge. Medications were reviewed in full on discharge, including: Medications to be started; medications to be stopped; medications to be continued from preadmission and any side effects.   Prescriptions were e-scribed at discharge and were able to be filled. Yes  If unable to obtain prescriptions: explain: N/A    Medications are being taken as prescribed.    She did not have any questions regarding discharge instructions or condition.

## 2023-03-22 ENCOUNTER — PATIENT OUTREACH (OUTPATIENT)
Dept: CARE COORDINATION | Facility: OTHER | Age: 23
End: 2023-03-22

## 2023-03-22 NOTE — PROGRESS NOTES
"Clinic Care Coordination Contact  [facility]: Post-Discharge Note  SITUATION                                                      Admission:    Admission Date: 03/16/23   Reason for Admission: Presents to hospital in setting of worsening depressive symptoms and increasing frequency and intensity of suicidal ideation. Reportedly tied a rope around her neck several days prior to admission. Reports of paranoia and AH. Presents on a voluntary basis. Patient was subsequently transferred and accepted for inpatient psychiatric hospitalization at [facility] for further safety and further stabilization  Discharge:   Discharge Date: 03/19/23  Discharge Diagnosis: Major Depressive Disorder, recurrent, severe with psychosis  PTSD  Cannabis Use    BACKGROUND                                                      Per hospital discharge summary and inpatient provider notes:  Patient was admitted to unit 5 due to the aforementioned presentation. The patient was placed under 15 minute checks to ensure patient safety.  Patient agreeable to start prozac and prn zyprexa for anxiety, as she felt this helped with anxiety the most.  She reports feeling safe and \"not nervous\" going out to open unit and states \"I feel good\" overall.  She slept good last night.  Denies having any suicidal thoughts on the unit and states \"I would never go through with anything like that\".  We discuss her plan if having suicidal thoughts again, she plans to tell someone right away.  Patient plans to leave her boyfriend and stay with her mom.  She feels ready for discharge.      Inform patient of the recommendation to stop taking Vyvanse and limit the amount of THC as both of these can increase anxiety and paranoia.     Call out to Ly, patient's mother who is onboard with patient discharging today and plans to stay with her.  Ly will pick patient up at 11am today.  We discuss medications and future recommendations. " "      Ms. Jarrett did not require seclusion/restraint during hospitalization.      We reviewed with Ms. Jarrett current and past medication trials including duration, dose, response and side effects. During this hospitalization, the following changes to the patient's psychotropic medications were made:     PTA psychotropic medications stopped:      - Held both Vyvanse and prazosin - recommend stopping both     PTA psychotropic medications continued/changed:      - none     New medications tried and stopped:      -escitalopram 10mg - stopped d/t \"didn't like it\"     New medications initiated:      -Started prozac 10mg daily  -Started trazodone 50mg prn for sleep  -Started zyprexa 5mg prn for anxiety/paranoia     With these changes and supports the patient noticed improvement in their symptoms and felt sufficiently ready for discharge. As a result, Marimar Jarrett was discharged. At the time of discharge, Marimar Jarrett was determined to not be a danger to self or others. The patient was also medically stable for discharge. At the current time of discharge, the patient does not meet criteria for involuntary hospitalization. On the day of discharge, the patient reports that they do not have suicidal or homicidal ideation. Steps taken to minimize risk include: assessing patient s behavior and thought process daily during hospital stay, discharging patient with adequate plan for follow up for mental and physical health and discussing safety plan of returning to the hospital should the patient ever have thoughts of harming themselves or others. Therefore, based on all available evidence including the factors cited above, the patient does not appear to be at imminent risk for self-harm, and is appropriate for outpatient level of care. However, if patient uses substances or is medication non-adherent, their risk of decompensation and SI will be elevated. This was discussed with the patient.    ASSESSMENT           Discharge " Assessment  How are you doing now that you are home?: States that she is doing really well and is feeling good. Explains that she just had a follow up video visit with her mental health med management provider through [facility] and has a follow up apt scheduled with this provider on 4/10. explains that she does not feel it is necessary to see PCP at this time as she jsut saw her MH provider.  How are your symptoms? (Red Flag symptoms escalate to triage hotline per guidelines): Improved  Do you feel your condition is stable enough to be safe at home until your provider visit?: Yes  Does the patient have their discharge instructions? : Yes  Does the patient have questions regarding their discharge instructions? : No  Were you started on any new medications or were there changes to any of your previous medications? : Yes  Does the patient have all of their medications?: Yes  Do you have questions regarding any of your medications? : Yes (see comment) (questions regarding xyprexa on whether or not it is a bad thing if she is taking this daily - [facility] to get clarfication on this medication and how they would like her to take this.)  Discharge follow-up appointment scheduled within 14 calendar days? : No  Is patient agreeable to assistance with scheduling? : No                PLAN                                                      Outpatient Plan:  completed follow up virtual visit with [facility] provider today and has another follow up scheduled 4/10 - does not want to see PCP at this time and will call to schedule if/when she feels this is needed.     No future appointments.      For any urgent concerns, please contact our 24 hour nurse triage line: [phone] ([phone])         Luz Ponce RN

## 2023-03-22 NOTE — Clinical Note
TCM completed - completed follow up virtual visit with [facility] provider today and has another follow up scheduled 4/10 - does not want to see PCP at this time and will call to schedule if/when she feels this is needed.

## 2023-03-30 ENCOUNTER — HOSPITAL ENCOUNTER (EMERGENCY)
Facility: HOSPITAL | Age: 23
Discharge: HOME | End: 2023-03-30
Attending: EMERGENCY MEDICINE | Admitting: EMERGENCY MEDICINE
Payer: COMMERCIAL

## 2023-03-30 VITALS
BODY MASS INDEX: 21.96 KG/M2 | OXYGEN SATURATION: 98 % | WEIGHT: 112.43 LBS | TEMPERATURE: 98 F | HEART RATE: 85 BPM | RESPIRATION RATE: 30 BRPM | SYSTOLIC BLOOD PRESSURE: 106 MMHG | DIASTOLIC BLOOD PRESSURE: 45 MMHG

## 2023-03-30 DIAGNOSIS — F41.0 ANXIETY ATTACK: ICD-10-CM

## 2023-03-30 PROCEDURE — 99283 EMERGENCY DEPT VISIT LOW MDM: CPT | Performed by: EMERGENCY MEDICINE

## 2023-03-30 PROCEDURE — 99283 EMERGENCY DEPT VISIT LOW MDM: CPT

## 2023-03-30 RX ORDER — HYDROXYZINE HYDROCHLORIDE 25 MG/1
25 TABLET, FILM COATED ORAL 3 TIMES DAILY PRN
Qty: 15 TABLET | Refills: 0 | Status: SHIPPED | OUTPATIENT
Start: 2023-03-30 | End: 2024-06-03

## 2023-03-30 RX ORDER — LORAZEPAM 2 MG/ML
1 INJECTION INTRAMUSCULAR ONCE
Status: DISCONTINUED | OUTPATIENT
Start: 2023-03-30 | End: 2023-03-30 | Stop reason: HOSPADM

## 2023-03-30 ASSESSMENT — ENCOUNTER SYMPTOMS
CONSTITUTIONAL NEGATIVE: 1
EYES NEGATIVE: 1
NEUROLOGICAL NEGATIVE: 1
NERVOUS/ANXIOUS: 1
GASTROINTESTINAL NEGATIVE: 1
MUSCULOSKELETAL NEGATIVE: 1
SHORTNESS OF BREATH: 1
CARDIOVASCULAR NEGATIVE: 1
ENDOCRINE NEGATIVE: 1

## 2023-03-30 ASSESSMENT — ACTIVITIES OF DAILY LIVING (ADL): ADLS_ACUITY_SCORE: 35

## 2023-03-30 NOTE — DISCHARGE INSTRUCTIONS
You have an appointment at [facility] at 2 pm Friday March 31st for intake.  Call Abi Peck the  at [phone] if you have any further questions or concerns

## 2023-03-30 NOTE — ED PROVIDER NOTES
"  History     Chief Complaint   Patient presents with     Panic Attack     HPI  Marimar Jarrett is a 23 year old female who comes to the emergency department complaining of severe anxiety.  Patient states when she woke up this morning she was very anxious.  She does take medication for anxiety.  She states she took her medications this morning but it does not seem to have helped.  She states that her life has been \"sucking\" lately.  Patient has apparently recently had inpatient treatment for depression.  She is quite anxious.  She is hyperventilating and tearful.    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     Priority: Medium     Adenotonsillar hypertrophy 08/16/2013     Priority: Medium        Past Medical History:    Past Medical History:   Diagnosis Date     Streptococcal pharyngitis        Past Surgical History:    Past Surgical History:   Procedure Laterality Date     TONSILLECTOMY, ADENOIDECTOMY, COMBINED  8/22/2013    Procedure: COMBINED TONSILLECTOMY, ADENOIDECTOMY;  TONSILLECTOMY AND ADENOIDECTOMY;  Surgeon: Cherie Hodgson DO;  Location: [facility]     wisdom teeth  11/2021       Family History:    Family History   Problem Relation Age of Onset     Circulatory Mother      Cancer Father      Diabetes Father        Social History:  Marital Status:  Single [1]  Social History     Tobacco Use     Smoking status: Never     Smokeless tobacco: Never   Vaping Use     Vaping Use: Never used   Substance Use Topics     Alcohol use: No     Drug use: No        Medications:    hydrOXYzine (ATARAX) 25 MG tablet  FEROSUL 325 (65 Fe) MG tablet  FLUoxetine (PROZAC) 10 MG capsule  medical cannabis (Patient's own supply)  OLANZapine (ZYPREXA) 5 MG tablet  traZODone (DESYREL) 50 MG " tablet  valACYclovir (VALTREX) 500 MG tablet          Review of Systems   Constitutional: Negative.    HENT: Negative.    Eyes: Negative.    Respiratory: Positive for shortness of breath.    Cardiovascular: Negative.    Gastrointestinal: Negative.    Endocrine: Negative.    Genitourinary: Negative.    Musculoskeletal: Negative.    Neurological: Negative.    Psychiatric/Behavioral: The patient is nervous/anxious.    All other systems reviewed and found unremarkable    Physical Exam   BP: (!) 106/45  Pulse: 85  Temp: 98  F (36.7  C)  Resp: (!) 30  Weight: 51 kg (112 lb 7 oz)  SpO2: 98 %      Physical Exam 23-year-old young lady who is awake alert oriented person place and time.  She is pleasant and cooperative but she appears quite anxious.  She is tearful and hyperventilating.  HEENT normocephalic extract muscles intact pupils equally round and reactive light oropharynx clear.  Neck supple full range of motion without pain.  Lungs clear bilaterally.  Heart maintains a regular rate and rhythm S1 and S2 sounds appreciated.  Abdomen soft nontender.  Extremities full range of motion no edema.  Neurologic exam no focal deficit.  Psychiatric exam patient is awake and alert she is hyperventilating she is tearful she appears to be extremely anxious.  She denies suicidal or homicidal ideation    ED Course              ED Course as of 03/30/23 1242   Thu Mar 30, 2023   1239 The patient remained stable throughout her stay in the department.  She felt subjectively much improved after lorazepam.  She will be discharged.  I will prescribe hydroxyzine.  [facility] our excellent emergency department  interviewed the patient and has provided her with outpatient resources.  We will also make a follow-up appointment with her primary care provider.  I will also advise her to return to the emergency department for further evaluation if her symptoms are not improving or if they seem to be getting worse                          No results found for this or any previous visit (from the past 24 hour(s)).    Medications   LORazepam (ATIVAN) injection 1 mg (has no administration in time range)       Assessments & Plan (with Medical Decision Making)     I have reviewed the nursing notes.    I have reviewed the findings, diagnosis, plan and need for follow up with the patient.          Medical Decision Making  The patient's presentation was of low complexity (an acute and uncomplicated illness or injury).    The patient's evaluation involved:  an assessment requiring an independent historian (see separate area of note for details)    The patient's management necessitated moderate risk (prescription drug management including medications given in the ED).        New Prescriptions    HYDROXYZINE (ATARAX) 25 MG TABLET    Take 1 tablet (25 mg) by mouth 3 times daily as needed for itching       Final diagnoses:   Anxiety attack       3/30/2023   [facility]     Pacheco Moses,   03/30/23 1242

## 2023-03-30 NOTE — ED NOTES
"Care Transitions focused note:      Chart reviewed, patient presents to ED today with anxiety/panic attacks.  Pt was recently hospitalized and discharged from the behavioral health unit on March 19th.    Pt is going through a breakup with her sig other at this time and is having ongoing stress from this.  She states she is very close to her mother who is supportive and she will be moving into her home in [place].  She was seeing a medication provider but is not really happy with how this is going.  \"she just keeps upping the doses of the medication and she doesn't really ask me how I am feeling\"    Pt is requesting a new medication provider.  She denies feeling suicidal or homicidal at this time.  She is also worried about missing work and I reassured her that we would give her a work note and she should talk to her immediate supervisor.     Will set her up with a paperwork appointment at [facility] so she can get an appointment with [facility] Jimenez, a medication provider there.    Long discussion about the [facility] in [place].  Provided her with the number and my contact information.  There is a bed open today and she was informed of this.    Will assist with referral paperwork and follow along.  Pt states she has a ride home to her mom's house today upon discharge.      AMBER Billingsley  "

## 2023-03-30 NOTE — Clinical Note
Marimar Jarrett was seen and treated in our emergency department on 3/30/2023.  She may return to work on 03/31/2023.       If you have any questions or concerns, please don't hesitate to call.      Pacheco Moses, DO

## 2023-03-30 NOTE — ED TRIAGE NOTES
24 y/o female presents with reports of chest tightness and anxiety/panic attacks since this morning. She reports she recently was hospitalized on behavioral health for 4 days due to depression.

## 2023-04-09 ENCOUNTER — HOSPITAL ENCOUNTER (EMERGENCY)
Facility: HOSPITAL | Age: 23
Discharge: HOME | End: 2023-04-09
Attending: INTERNAL MEDICINE | Admitting: INTERNAL MEDICINE
Payer: COMMERCIAL

## 2023-04-09 VITALS
HEART RATE: 76 BPM | RESPIRATION RATE: 16 BRPM | DIASTOLIC BLOOD PRESSURE: 55 MMHG | OXYGEN SATURATION: 96 % | TEMPERATURE: 97.1 F | SYSTOLIC BLOOD PRESSURE: 92 MMHG

## 2023-04-09 DIAGNOSIS — F10.920 ALCOHOLIC INTOXICATION WITHOUT COMPLICATION (H): ICD-10-CM

## 2023-04-09 LAB
ALBUMIN SERPL BCG-MCNC: 4.4 G/DL (ref 3.5–5.2)
ALP SERPL-CCNC: 55 U/L (ref 35–104)
ALT SERPL W P-5'-P-CCNC: 39 U/L (ref 10–35)
ANION GAP SERPL CALCULATED.3IONS-SCNC: 12 MMOL/L (ref 7–15)
AST SERPL W P-5'-P-CCNC: 84 U/L (ref 10–35)
BASOPHILS # BLD AUTO: 0.1 10E3/UL (ref 0–0.2)
BASOPHILS NFR BLD AUTO: 1 %
BILIRUB SERPL-MCNC: 0.5 MG/DL
BUN SERPL-MCNC: 12.5 MG/DL (ref 6–20)
CALCIUM SERPL-MCNC: 8.3 MG/DL (ref 8.6–10)
CHLORIDE SERPL-SCNC: 101 MMOL/L (ref 98–107)
CREAT SERPL-MCNC: 0.69 MG/DL (ref 0.51–0.95)
DEPRECATED HCO3 PLAS-SCNC: 23 MMOL/L (ref 22–29)
EOSINOPHIL # BLD AUTO: 0.2 10E3/UL (ref 0–0.7)
EOSINOPHIL NFR BLD AUTO: 2 %
ERYTHROCYTE [DISTWIDTH] IN BLOOD BY AUTOMATED COUNT: 12.7 % (ref 10–15)
ETHANOL SERPL-MCNC: 0.23 G/DL
GFR SERPL CREATININE-BSD FRML MDRD: >90 ML/MIN/1.73M2
GLUCOSE SERPL-MCNC: 123 MG/DL (ref 70–99)
HCT VFR BLD AUTO: 43.5 % (ref 35–47)
HGB BLD-MCNC: 14.5 G/DL (ref 11.7–15.7)
IMM GRANULOCYTES # BLD: 0 10E3/UL
IMM GRANULOCYTES NFR BLD: 0 %
LYMPHOCYTES # BLD AUTO: 2.1 10E3/UL (ref 0.8–5.3)
LYMPHOCYTES NFR BLD AUTO: 27 %
MCH RBC QN AUTO: 30.7 PG (ref 26.5–33)
MCHC RBC AUTO-ENTMCNC: 33.3 G/DL (ref 31.5–36.5)
MCV RBC AUTO: 92 FL (ref 78–100)
MONOCYTES # BLD AUTO: 0.8 10E3/UL (ref 0–1.3)
MONOCYTES NFR BLD AUTO: 10 %
NEUTROPHILS # BLD AUTO: 4.8 10E3/UL (ref 1.6–8.3)
NEUTROPHILS NFR BLD AUTO: 60 %
NRBC # BLD AUTO: 0 10E3/UL
NRBC BLD AUTO-RTO: 0 /100
PLATELET # BLD AUTO: 267 10E3/UL (ref 150–450)
POTASSIUM SERPL-SCNC: 3.6 MMOL/L (ref 3.4–5.3)
PROT SERPL-MCNC: 6.4 G/DL (ref 6.4–8.3)
RBC # BLD AUTO: 4.73 10E6/UL (ref 3.8–5.2)
SODIUM SERPL-SCNC: 136 MMOL/L (ref 136–145)
WBC # BLD AUTO: 7.9 10E3/UL (ref 4–11)

## 2023-04-09 PROCEDURE — 250N000011 HC RX IP 250 OP 636: Performed by: INTERNAL MEDICINE

## 2023-04-09 PROCEDURE — 99284 EMERGENCY DEPT VISIT MOD MDM: CPT | Mod: 25

## 2023-04-09 PROCEDURE — 36415 COLL VENOUS BLD VENIPUNCTURE: CPT | Performed by: INTERNAL MEDICINE

## 2023-04-09 PROCEDURE — 82077 ASSAY SPEC XCP UR&BREATH IA: CPT | Performed by: INTERNAL MEDICINE

## 2023-04-09 PROCEDURE — 85025 COMPLETE CBC W/AUTO DIFF WBC: CPT | Performed by: INTERNAL MEDICINE

## 2023-04-09 PROCEDURE — 96374 THER/PROPH/DIAG INJ IV PUSH: CPT

## 2023-04-09 PROCEDURE — 80053 COMPREHEN METABOLIC PANEL: CPT | Performed by: INTERNAL MEDICINE

## 2023-04-09 PROCEDURE — 99283 EMERGENCY DEPT VISIT LOW MDM: CPT | Performed by: INTERNAL MEDICINE

## 2023-04-09 PROCEDURE — 96375 TX/PRO/DX INJ NEW DRUG ADDON: CPT

## 2023-04-09 RX ORDER — ONDANSETRON 2 MG/ML
8 INJECTION INTRAMUSCULAR; INTRAVENOUS ONCE
Status: COMPLETED | OUTPATIENT
Start: 2023-04-09 | End: 2023-04-09

## 2023-04-09 RX ADMIN — ONDANSETRON 8 MG: 2 INJECTION INTRAMUSCULAR; INTRAVENOUS at 03:33

## 2023-04-09 RX ADMIN — FAMOTIDINE 20 MG: 10 INJECTION, SOLUTION INTRAVENOUS at 03:33

## 2023-04-09 ASSESSMENT — ENCOUNTER SYMPTOMS
DIZZINESS: 0
LIGHT-HEADEDNESS: 0
COUGH: 0
PALPITATIONS: 0
NECK PAIN: 0
HEMATURIA: 0
CONFUSION: 0
WOUND: 0
NUMBNESS: 0
CHILLS: 0
NECK STIFFNESS: 0
CHEST TIGHTNESS: 0
BLOOD IN STOOL: 0
FLANK PAIN: 0
MYALGIAS: 0
VOMITING: 1
ABDOMINAL DISTENTION: 0
VOICE CHANGE: 0
ABDOMINAL PAIN: 0
DIAPHORESIS: 0
HEADACHES: 0
COLOR CHANGE: 0
NAUSEA: 1
SHORTNESS OF BREATH: 0
FEVER: 0
BACK PAIN: 0
DYSURIA: 0
WHEEZING: 0
ARTHRALGIAS: 0
ANAL BLEEDING: 0

## 2023-04-09 ASSESSMENT — ACTIVITIES OF DAILY LIVING (ADL): ADLS_ACUITY_SCORE: 35

## 2023-04-09 ASSESSMENT — LIFESTYLE VARIABLES: INTOXICATION: 1

## 2023-04-09 NOTE — ED TRIAGE NOTES
Pt presents via ems for acute ETOH intoxication. EMS was called by friends due to her laying on the ground throwing up. According to EMS she had 7 drinks at the bar and was also drinking with friends before the bar. Pt able to answer questions but is very tired. Calm and cooperative.     London Hankins, MSN, RN on 4/9/2023 at 3:16 AM

## 2023-04-09 NOTE — ED PROVIDER NOTES
History     Chief Complaint   Patient presents with     Alcohol Intoxication     The history is provided by the patient and the EMS personnel.   Alcohol Intoxication  Severity:  Severe  Onset quality:  Gradual  Timing:  Constant  Progression:  Worsening  Chronicity:  Recurrent  Suspected agents:  Alcohol  Associated symptoms: nausea and vomiting    Associated symptoms: no abdominal pain, no confusion, no headaches, no palpitations, no shortness of breath and no suicidal ideation      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     Priority: Medium     Adenotonsillar hypertrophy 08/16/2013     Priority: Medium        Past Medical History:    Past Medical History:   Diagnosis Date     Streptococcal pharyngitis        Past Surgical History:    Past Surgical History:   Procedure Laterality Date     TONSILLECTOMY, ADENOIDECTOMY, COMBINED  8/22/2013    Procedure: COMBINED TONSILLECTOMY, ADENOIDECTOMY;  TONSILLECTOMY AND ADENOIDECTOMY;  Surgeon: Cherie Hodgson DO;  Location: [facility]     wisdom teeth  11/2021       Family History:    Family History   Problem Relation Age of Onset     Circulatory Mother      Cancer Father      Diabetes Father        Social History:  Marital Status:  Single [1]  Social History     Tobacco Use     Smoking status: Never     Smokeless tobacco: Never   Vaping Use     Vaping status: Never Used   Substance Use Topics     Alcohol use: No     Drug use: No        Medications:    FEROSUL 325 (65 Fe) MG tablet  FLUoxetine (PROZAC) 10 MG capsule  hydrOXYzine (ATARAX) 25 MG tablet  medical cannabis (Patient's own supply)  OLANZapine (ZYPREXA) 5 MG tablet  traZODone (DESYREL) 50 MG tablet  valACYclovir (VALTREX) 500 MG tablet          Review of Systems    Constitutional: Negative for chills, diaphoresis and fever.   HENT: Negative for voice change.    Eyes: Negative for visual disturbance.   Respiratory: Negative for cough, chest tightness, shortness of breath and wheezing.    Cardiovascular: Negative for chest pain, palpitations and leg swelling.   Gastrointestinal: Positive for nausea and vomiting. Negative for abdominal distention, abdominal pain, anal bleeding and blood in stool.   Genitourinary: Negative for decreased urine volume, dysuria, flank pain and hematuria.   Musculoskeletal: Negative for arthralgias, back pain, gait problem, myalgias, neck pain and neck stiffness.   Skin: Negative for color change, pallor, rash and wound.   Neurological: Negative for dizziness, syncope, light-headedness, numbness and headaches.   Psychiatric/Behavioral: Negative for confusion and suicidal ideas.       Physical Exam   BP: 114/85  Pulse: 64  Temp: (!) 96.2  F (35.7  C)  Resp: 20  SpO2: 99 %      Physical Exam  Vitals and nursing note reviewed.   Constitutional:       Appearance: She is well-developed.   HENT:      Head: Normocephalic and atraumatic.      Mouth/Throat:      Pharynx: No oropharyngeal exudate.   Eyes:      Conjunctiva/sclera: Conjunctivae normal.      Pupils: Pupils are equal, round, and reactive to light.   Neck:      Thyroid: No thyromegaly.      Vascular: No JVD.      Trachea: No tracheal deviation.   Cardiovascular:      Rate and Rhythm: Normal rate and regular rhythm.      Heart sounds: Normal heart sounds. No murmur heard.     No friction rub. No gallop.   Pulmonary:      Effort: Pulmonary effort is normal. No respiratory distress.      Breath sounds: Normal breath sounds. No stridor. No wheezing or rales.   Chest:      Chest wall: No tenderness.   Abdominal:      General: Bowel sounds are normal. There is no distension.      Palpations: Abdomen is soft. There is no mass.      Tenderness: There is no abdominal tenderness. There is no guarding or  rebound.   Musculoskeletal:         General: No tenderness. Normal range of motion.      Cervical back: Normal range of motion and neck supple.   Lymphadenopathy:      Cervical: No cervical adenopathy.   Skin:     General: Skin is warm and dry.      Coloration: Skin is not pale.      Findings: No erythema or rash.   Neurological:      Mental Status: She is alert and oriented to person, place, and time.   Psychiatric:         Behavior: Behavior normal.         ED Course                 Procedures           No results found for this or any previous visit (from the past 24 hour(s)).    Medications   famotidine (PEPCID) injection 20 mg (20 mg Intravenous $Given 4/9/23 0333)   ondansetron (ZOFRAN) injection 8 mg (8 mg Intravenous $Given 4/9/23 0333)       Assessments & Plan (with Medical Decision Making)   Alcohol intoxication, no sign of  trauma  Pt slept and woke up, AAox3, walked steady  Vomiting stopped   D C Home    I have reviewed the nursing notes.    I have reviewed the findings, diagnosis, plan and need for follow up with the patient.          Discharge Medication List as of 4/9/2023  6:49 AM          Final diagnoses:   Alcoholic intoxication without complication (H)       4/9/2023   [facility]     Renan Li MD  04/10/23 2109       Renan Li MD  04/10/23 2110

## 2023-04-09 NOTE — ED NOTES
Pt sleeps, wakes with repeated stimulation. Mother remains at bedside, will try in another hour for ambulation and intake test,

## 2023-04-09 NOTE — ED NOTES
"Pt is pleasant, cooperative, apologetic for being drunk. Pt states she had \"a lot of booze\" [place]. Pt side lying, curled up, eyes closed. Answers questions appropriately.   Denies drug use.   Dry heaving.   "

## 2023-04-19 ENCOUNTER — HOSPITAL ENCOUNTER (EMERGENCY)
Facility: HOSPITAL | Age: 23
Discharge: HOME | End: 2023-04-19
Attending: NURSE PRACTITIONER | Admitting: NURSE PRACTITIONER
Payer: COMMERCIAL

## 2023-04-19 VITALS
HEIGHT: 60 IN | RESPIRATION RATE: 14 BRPM | OXYGEN SATURATION: 97 % | HEART RATE: 83 BPM | TEMPERATURE: 97.9 F | BODY MASS INDEX: 22.07 KG/M2 | WEIGHT: 112.43 LBS

## 2023-04-19 DIAGNOSIS — B96.89 BACTERIAL VAGINOSIS: ICD-10-CM

## 2023-04-19 DIAGNOSIS — N30.01 ACUTE CYSTITIS WITH HEMATURIA: Primary | ICD-10-CM

## 2023-04-19 DIAGNOSIS — B37.31 VAGINAL YEAST INFECTION: ICD-10-CM

## 2023-04-19 DIAGNOSIS — N76.0 BACTERIAL VAGINOSIS: ICD-10-CM

## 2023-04-19 LAB
ALBUMIN UR-MCNC: 10 MG/DL
APPEARANCE UR: ABNORMAL
BILIRUB UR QL STRIP: ABNORMAL
C TRACH DNA SPEC QL PROBE+SIG AMP: NEGATIVE
CLUE CELLS: PRESENT
COLOR UR AUTO: ABNORMAL
GLUCOSE UR STRIP-MCNC: NEGATIVE MG/DL
HGB UR QL STRIP: ABNORMAL
KETONES UR STRIP-MCNC: NEGATIVE MG/DL
LEUKOCYTE ESTERASE UR QL STRIP: ABNORMAL
MUCOUS THREADS #/AREA URNS LPF: PRESENT /LPF
N GONORRHOEA DNA SPEC QL NAA+PROBE: NEGATIVE
NITRATE UR QL: POSITIVE
PH UR STRIP: 7 [PH] (ref 4.7–8)
RBC URINE: 23 /HPF
RENAL TUB EPI: 1 /HPF
SP GR UR STRIP: 1.02 (ref 1–1.03)
SQUAMOUS EPITHELIAL: 0 /HPF
TRICHOMONAS, WET PREP: ABNORMAL
UROBILINOGEN UR STRIP-MCNC: 3 MG/DL
WBC URINE: 163 /HPF
WBC'S/HIGH POWER FIELD, WET PREP: ABNORMAL
YEAST, WET PREP: PRESENT

## 2023-04-19 PROCEDURE — G0463 HOSPITAL OUTPT CLINIC VISIT: HCPCS

## 2023-04-19 PROCEDURE — 81001 URINALYSIS AUTO W/SCOPE: CPT | Performed by: NURSE PRACTITIONER

## 2023-04-19 PROCEDURE — 87088 URINE BACTERIA CULTURE: CPT | Performed by: NURSE PRACTITIONER

## 2023-04-19 PROCEDURE — 99213 OFFICE O/P EST LOW 20 MIN: CPT | Performed by: NURSE PRACTITIONER

## 2023-04-19 PROCEDURE — 87591 N.GONORRHOEAE DNA AMP PROB: CPT | Performed by: NURSE PRACTITIONER

## 2023-04-19 PROCEDURE — 87210 SMEAR WET MOUNT SALINE/INK: CPT | Performed by: NURSE PRACTITIONER

## 2023-04-19 RX ORDER — METRONIDAZOLE 7.5 MG/G
1 GEL VAGINAL DAILY
Qty: 25 G | Refills: 0 | Status: SHIPPED | OUTPATIENT
Start: 2023-04-19 | End: 2023-04-24

## 2023-04-19 RX ORDER — FLUCONAZOLE 150 MG/1
150 TABLET ORAL
Qty: 2 TABLET | Refills: 0 | Status: SHIPPED | OUTPATIENT
Start: 2023-04-19 | End: 2024-06-03

## 2023-04-19 RX ORDER — CEPHALEXIN 500 MG/1
500 CAPSULE ORAL 2 TIMES DAILY
Qty: 14 CAPSULE | Refills: 0 | Status: SHIPPED | OUTPATIENT
Start: 2023-04-19 | End: 2023-04-26

## 2023-04-19 ASSESSMENT — ENCOUNTER SYMPTOMS
SHORTNESS OF BREATH: 0
PSYCHIATRIC NEGATIVE: 1
FLANK PAIN: 0
ABDOMINAL PAIN: 0
CHILLS: 0
FEVER: 0
VOMITING: 0
DYSURIA: 0
DIARRHEA: 0
FREQUENCY: 0
HEMATURIA: 1
NAUSEA: 0

## 2023-04-19 ASSESSMENT — ACTIVITIES OF DAILY LIVING (ADL): ADLS_ACUITY_SCORE: 35

## 2023-04-19 NOTE — DISCHARGE INSTRUCTIONS
Cephalexin as ordered for urinary tract infection  - Take entire course of antibiotic even if you start to feel better.  - Antibiotics can cause stomach upset including nausea and diarrhea. Read your bottle or ask the pharmacist if antibiotic can be taken with food to help prevent nausea. If you have symptoms of diarrhea you can take an over-the-counter probiotic and/or increase foods with probiotics such as yogurt, [place], sauerkraut.    Fluconazole as ordered for yeast infection    Metronidazole vaginal gel-1 applicator nightly for 5 nights    Refrain from sexual intercourse for 1 week    Will notify you of gonorrhea and Chlamydia results once they finalize    Push fluids    Follow-up with primary care provider or return to urgent care/ED with any worsening in condition or additional concerns.

## 2023-04-19 NOTE — ED PROVIDER NOTES
History     Chief Complaint   Patient presents with     Rule out Urinary Tract Infection     C/o uti symptoms     HPI  Marimar Jarrett is a 23 year old female who presents to urgent care today ambulatory with complaints of bladder pressure and hematuria which started earlier this week.  Denies any history of UTIs.  Denies any abdominal, pelvic or flank pain.  Denies any rashes.  Denies any fever, chills, nausea, vomiting, diarrhea, shortness of breath or chest pain.  Denies any genital sores or skin issues.  New partner recently, wants to be checked for gonorrhea and chlamydia, declines any other STD testing at this time.  Has been taking cranberry tablets and pushing fluids for symptoms.  Not currently pregnant.  No other concerns.    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     Priority: Medium     Adenotonsillar hypertrophy 08/16/2013     Priority: Medium        Past Medical History:    Past Medical History:   Diagnosis Date     Streptococcal pharyngitis        Past Surgical History:    Past Surgical History:   Procedure Laterality Date     TONSILLECTOMY, ADENOIDECTOMY, COMBINED  8/22/2013    Procedure: COMBINED TONSILLECTOMY, ADENOIDECTOMY;  TONSILLECTOMY AND ADENOIDECTOMY;  Surgeon: Cherie Hodgson DO;  Location: [facility]     wisdom teeth  11/2021       Family History:    Family History   Problem Relation Age of Onset     Circulatory Mother      Cancer Father      Diabetes Father        Social History:  Marital Status:  Single [1]  Social History     Tobacco Use     Smoking status: Never     Smokeless tobacco: Never   Vaping Use     Vaping status: Never Used   Substance Use Topics     Alcohol use: No     Drug use: No        Medications:    cephALEXin (KEFLEX) 500  MG capsule  fluconazole (DIFLUCAN) 150 MG tablet  metroNIDAZOLE (METROGEL) 0.75 % vaginal gel  FEROSUL 325 (65 Fe) MG tablet  FLUoxetine (PROZAC) 10 MG capsule  hydrOXYzine (ATARAX) 25 MG tablet  medical cannabis (Patient's own supply)  OLANZapine (ZYPREXA) 5 MG tablet  traZODone (DESYREL) 50 MG tablet  valACYclovir (VALTREX) 500 MG tablet      Review of Systems   Constitutional: Negative for chills and fever.   Respiratory: Negative for shortness of breath.    Cardiovascular: Negative for chest pain.   Gastrointestinal: Negative for abdominal pain, diarrhea, nausea and vomiting.   Genitourinary: Positive for hematuria. Negative for dysuria, flank pain, frequency, genital sores, pelvic pain, vaginal bleeding, vaginal discharge and vaginal pain.        Bladder pressure at times   Musculoskeletal: Negative for gait problem.   Skin: Negative for rash.   Psychiatric/Behavioral: Negative.      Physical Exam   Pulse: 83  Temp: 97.9  F (36.6  C)  Resp: 14  Height: 152.4 cm (5')  Weight: 51 kg (112 lb 7 oz)  SpO2: 97 %    Physical Exam  Vitals and nursing note reviewed.   Constitutional:       General: She is not in acute distress.     Appearance: Normal appearance. She is not ill-appearing or toxic-appearing.   Cardiovascular:      Rate and Rhythm: Normal rate and regular rhythm.      Pulses: Normal pulses.      Heart sounds: Normal heart sounds.   Pulmonary:      Effort: Pulmonary effort is normal.      Breath sounds: Normal breath sounds.   Abdominal:      General: Bowel sounds are normal.      Palpations: Abdomen is soft.      Tenderness: There is no abdominal tenderness. There is no right CVA tenderness or left CVA tenderness.   Neurological:      Mental Status: She is alert.   Psychiatric:         Mood and Affect: Mood normal.       ED Course     Results for orders placed or performed during the hospital encounter of 04/19/23 (from the past 24 hour(s))   UA with Microscopic reflex to Culture    Specimen: Urine,  Midstream   Result Value Ref Range    Color Urine Dark Yellow (A) Colorless, Straw, Light Yellow, Yellow    Appearance Urine Slightly Cloudy (A) Clear    Glucose Urine Negative Negative mg/dL    Bilirubin Urine Small (A) Negative    Ketones Urine Negative Negative mg/dL    Specific Gravity Urine 1.021 1.003 - 1.035    Blood Urine Trace (A) Negative    pH Urine 7.0 4.7 - 8.0    Protein Albumin Urine 10 (A) Negative mg/dL    Urobilinogen Urine 3.0 (A) Normal, 2.0 mg/dL    Nitrite Urine Positive (A) Negative    Leukocyte Esterase Urine Large (A) Negative    Mucus Urine Present (A) None Seen /LPF    RBC Urine 23 (H) <=2 /HPF    WBC Urine 163 (H) <=5 /HPF    Squamous Epithelials Urine 0 <=1 /HPF    Renal Tubular Epithelials Urine 1 (H) None Seen /HPF    Narrative    Urine Culture ordered based on laboratory criteria   Wet prep    Specimen: Vagina; Swab   Result Value Ref Range    Trichomonas Absent Absent    Yeast Present (A) Absent    Clue Cells Present (A) Absent    WBCs/high power field 1+ (A) None       Medications - No data to display    Assessments & Plan (with Medical Decision Making)     I have reviewed the nursing notes.    I have reviewed the findings, diagnosis, plan and need for follow up with the patient.  (N30.01) Acute cystitis with hematuria  (primary encounter diagnosis)  (N76.0,  B96.89) Bacterial vaginosis  (B37.31) Vaginal yeast infection  Plan:   Patient ambulatory with a nontoxic appearance.  Patient arrived with complaints of bladder pressure which occurs at times and hematuria.  Onset was this past week.  Denies any fever, chills, nausea, vomiting, diarrhea, shortness of breath or chest pain.  Denies any abdominal, pelvic or flank pain.  No CVA tenderness.  No abdominal tenderness.  No rashes.  No genital sores or skin issues at this time per patient.  Patient not currently pregnant.  UA completed which indicates urinary tract infection, will treat with cephalexin.  Wet prep indicates bacterial  vaginosis, will treat with MetroGel.  Wet prep also indicates yeast infection, will treat with fluconazole.  Patient encouraged to take over-the-counter probiotic with antibiotic.  Refrain from intercourse for the next week.  Will notify patient of gonorrhea and Chlamydia results once they finalize.  Declines any other STD testing at this time.  Patient to push fluids.  Follow-up with primary care provider or return to urgent care/ED with any worsening in condition or additional concerns.  Patient is in agreement with treatment plan.    Discharge Medication List as of 4/19/2023  5:23 PM      START taking these medications    Details   cephALEXin (KEFLEX) 500 MG capsule Take 1 capsule (500 mg) by mouth 2 times daily for 7 days, Disp-14 capsule, R-0, E-Prescribe      fluconazole (DIFLUCAN) 150 MG tablet Take 1 tablet (150 mg) by mouth every 72 hours, Disp-2 tablet, R-0, E-Prescribe1 tablet now and 1 tablet in 72 hours.      metroNIDAZOLE (METROGEL) 0.75 % vaginal gel Place 1 applicator (5 g) vaginally daily for 5 days Nightly for 5 days.Disp-25 g, [id number]           Final diagnoses:   Acute cystitis with hematuria   Bacterial vaginosis   Vaginal yeast infection     4/19/2023   [facility]     Elvie Mera NP  04/19/23 4111

## 2023-04-19 NOTE — ED TRIAGE NOTES
Pt presents with c/o hematuria, bladder pressure. Pt has been taking cranberry pills and pushing fluids. Sx started at the beginning of the week. Denies hx of UTI's. Denies discharge. Denies chance of STD's. Wants to be checked for STD's anyway just to make sure. Concerns about her urine being bright in color.      Triage Assessment     Row Name 04/19/23 8870       Triage Assessment (Adult)    Airway WDL WDL       Respiratory WDL    Respiratory WDL WDL       Skin Circulation/Temperature WDL    Skin Circulation/Temperature WDL WDL       Cardiac WDL    Cardiac WDL WDL       Peripheral/Neurovascular WDL    Peripheral Neurovascular WDL WDL       Cognitive/Neuro/Behavioral WDL    Cognitive/Neuro/Behavioral WDL WDL

## 2023-04-21 LAB — BACTERIA UR CULT: ABNORMAL

## 2023-04-29 ENCOUNTER — HEALTH MAINTENANCE LETTER (OUTPATIENT)
Age: 23
End: 2023-04-29

## 2023-07-25 ENCOUNTER — TRANSFERRED RECORDS (OUTPATIENT)
Dept: HEALTH INFORMATION MANAGEMENT | Facility: CLINIC | Age: 23
End: 2023-07-25

## 2023-07-25 DIAGNOSIS — B00.1 RECURRENT COLD SORES: ICD-10-CM

## 2023-07-25 NOTE — TELEPHONE ENCOUNTER
Valacyclovir (Valtrex) 500 MG tablet     Last Written Prescription Date:  06/29/2022  Last Fill Quantity: 30,   # refills: 0  Last Office Visit: 05/22/2023

## 2023-07-27 RX ORDER — VALACYCLOVIR HYDROCHLORIDE 500 MG/1
500 TABLET, FILM COATED ORAL DAILY
Qty: 30 TABLET | Refills: 0 | Status: SHIPPED | OUTPATIENT
Start: 2023-07-27 | End: 2023-08-24

## 2023-08-14 ENCOUNTER — HOSPITAL ENCOUNTER (EMERGENCY)
Facility: HOSPITAL | Age: 23
Discharge: HOME | End: 2023-08-14
Attending: INTERNAL MEDICINE | Admitting: INTERNAL MEDICINE
Payer: COMMERCIAL

## 2023-08-14 VITALS
HEART RATE: 92 BPM | DIASTOLIC BLOOD PRESSURE: 83 MMHG | RESPIRATION RATE: 16 BRPM | OXYGEN SATURATION: 98 % | TEMPERATURE: 98.3 F | SYSTOLIC BLOOD PRESSURE: 124 MMHG

## 2023-08-14 DIAGNOSIS — Z20.2 STD EXPOSURE: ICD-10-CM

## 2023-08-14 LAB
C TRACH DNA SPEC QL PROBE+SIG AMP: NEGATIVE
CLUE CELLS: ABNORMAL
HCG UR QL: NEGATIVE
N GONORRHOEA DNA SPEC QL NAA+PROBE: NEGATIVE
TRICHOMONAS, WET PREP: ABNORMAL
WBC'S/HIGH POWER FIELD, WET PREP: ABNORMAL
YEAST, WET PREP: ABNORMAL

## 2023-08-14 PROCEDURE — 99283 EMERGENCY DEPT VISIT LOW MDM: CPT | Performed by: INTERNAL MEDICINE

## 2023-08-14 PROCEDURE — 81025 URINE PREGNANCY TEST: CPT | Performed by: INTERNAL MEDICINE

## 2023-08-14 PROCEDURE — 87210 SMEAR WET MOUNT SALINE/INK: CPT | Performed by: INTERNAL MEDICINE

## 2023-08-14 PROCEDURE — 99283 EMERGENCY DEPT VISIT LOW MDM: CPT

## 2023-08-14 PROCEDURE — 87591 N.GONORRHOEAE DNA AMP PROB: CPT | Performed by: INTERNAL MEDICINE

## 2023-08-14 ASSESSMENT — ENCOUNTER SYMPTOMS
SHORTNESS OF BREATH: 0
ANAL BLEEDING: 0
HEMATURIA: 0
ABDOMINAL PAIN: 0
BACK PAIN: 0
DIZZINESS: 0
BLOOD IN STOOL: 0
DYSURIA: 0
HEADACHES: 0
NUMBNESS: 0
NAUSEA: 0
LIGHT-HEADEDNESS: 0
CONFUSION: 0
VOMITING: 0
NERVOUS/ANXIOUS: 1
NECK PAIN: 0
FEVER: 0
WHEEZING: 0
CHEST TIGHTNESS: 0
NECK STIFFNESS: 0
FLANK PAIN: 0
PALPITATIONS: 0
ABDOMINAL DISTENTION: 0
COLOR CHANGE: 0
CHILLS: 0
DIAPHORESIS: 0
WOUND: 0
MYALGIAS: 0
ARTHRALGIAS: 0
VOICE CHANGE: 0
COUGH: 0

## 2023-08-14 ASSESSMENT — ACTIVITIES OF DAILY LIVING (ADL): ADLS_ACUITY_SCORE: 35

## 2023-08-14 NOTE — ED PROVIDER NOTES
History     Chief Complaint   Patient presents with    Exposure to STD     The history is provided by the patient.     Marimar Jarrett is a 23 year old female who came for unprotected sexual encounter yesterday and asking to be tested for all STD tests.     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     Priority: Medium    Adenotonsillar hypertrophy 08/16/2013     Priority: Medium        Past Medical History:    Past Medical History:   Diagnosis Date    Streptococcal pharyngitis        Past Surgical History:    Past Surgical History:   Procedure Laterality Date    TONSILLECTOMY, ADENOIDECTOMY, COMBINED  8/22/2013    Procedure: COMBINED TONSILLECTOMY, ADENOIDECTOMY;  TONSILLECTOMY AND ADENOIDECTOMY;  Surgeon: Cherie Hodgson DO;  Location: [facility]    wisdom teeth  11/2021       Family History:    Family History   Problem Relation Age of Onset    Circulatory Mother     Cancer Father     Diabetes Father        Social History:  Marital Status:  Single [1]  Social History     Tobacco Use    Smoking status: Never    Smokeless tobacco: Never   Vaping Use    Vaping Use: Never used   Substance Use Topics    Alcohol use: No    Drug use: No        Medications:    FEROSUL 325 (65 Fe) MG tablet  fluconazole (DIFLUCAN) 150 MG tablet  FLUoxetine (PROZAC) 10 MG capsule  hydrOXYzine (ATARAX) 25 MG tablet  medical cannabis (Patient's own supply)  OLANZapine (ZYPREXA) 5 MG tablet  traZODone (DESYREL) 50 MG tablet  valACYclovir (VALTREX) 500 MG tablet          Review of Systems   Constitutional:  Negative for chills, diaphoresis and fever.   HENT:  Negative for voice change.    Eyes:  Negative for visual disturbance.   Respiratory:  Negative for cough, chest tightness, shortness of breath and  wheezing.    Cardiovascular:  Negative for chest pain, palpitations and leg swelling.   Gastrointestinal:  Negative for abdominal distention, abdominal pain, anal bleeding, blood in stool, nausea and vomiting.   Genitourinary:  Negative for decreased urine volume, dysuria, flank pain and hematuria.   Musculoskeletal:  Negative for arthralgias, back pain, gait problem, myalgias, neck pain and neck stiffness.   Skin:  Negative for color change, pallor, rash and wound.   Neurological:  Negative for dizziness, syncope, light-headedness, numbness and headaches.   Psychiatric/Behavioral:  Negative for confusion and suicidal ideas. The patient is nervous/anxious.        Physical Exam   BP: 124/83  Pulse: 92  Temp: 98.3  F (36.8  C)  Resp: 16  SpO2: 98 %      Physical Exam  Constitutional:       General: She is not in acute distress.     Appearance: Normal appearance. She is not diaphoretic.   HENT:      Head: Atraumatic.      Mouth/Throat:      Mouth: Mucous membranes are moist.   Eyes:      General: No scleral icterus.     Conjunctiva/sclera: Conjunctivae normal.   Cardiovascular:      Rate and Rhythm: Normal rate.      Heart sounds: Normal heart sounds.   Pulmonary:      Effort: No respiratory distress.      Breath sounds: Normal breath sounds.   Abdominal:      General: Abdomen is flat.   Musculoskeletal:      Cervical back: Neck supple.   Skin:     General: Skin is warm.      Findings: No rash.   Neurological:      Mental Status: She is alert.   Psychiatric:         Mood and Affect: Mood is anxious.         ED Course                 Procedures                  Results for orders placed or performed during the hospital encounter of 08/14/23 (from the past 24 hour(s))   HCG qualitative urine   Result Value Ref Range    hCG Urine Qualitative Negative Negative   Wet prep    Specimen: Vagina; Swab   Result Value Ref Range    Trichomonas Absent Absent    Yeast Absent Absent    Clue Cells Absent Absent    WBCs/high power  field 1+ (A) None       Medications - No data to display    Assessments & Plan (with Medical Decision Making)   Unprotected intercourse    Refused labs test and also refused HIV PEP prophylaxis    GC urine test pending, she wants to follow later the result  D C home  I have reviewed the nursing notes.    I have reviewed the findings, diagnosis, plan and need for follow up with the patient.        New Prescriptions    No medications on file       Final diagnoses:   STD exposure       8/14/2023   [facility]       Renan Li MD  08/14/23 0596

## 2023-08-14 NOTE — ED TRIAGE NOTES
"Pt presents to ED with request for \"every STD test available\" and a pregnancy test. Pt denies any s/s, reports unprotected sex yesterday. Pt reports being \"on the pill.\"        "

## 2023-08-16 ENCOUNTER — OFFICE VISIT (OUTPATIENT)
Dept: CHIROPRACTIC MEDICINE | Facility: OTHER | Age: 23
End: 2023-08-16
Attending: CHIROPRACTOR
Payer: COMMERCIAL

## 2023-08-16 DIAGNOSIS — M99.01 SEGMENTAL AND SOMATIC DYSFUNCTION OF CERVICAL REGION: ICD-10-CM

## 2023-08-16 DIAGNOSIS — M99.03 SEGMENTAL AND SOMATIC DYSFUNCTION OF LUMBAR REGION: Primary | ICD-10-CM

## 2023-08-16 DIAGNOSIS — M99.02 SEGMENTAL AND SOMATIC DYSFUNCTION OF THORACIC REGION: ICD-10-CM

## 2023-08-16 DIAGNOSIS — M54.50 ACUTE BILATERAL LOW BACK PAIN WITHOUT SCIATICA: ICD-10-CM

## 2023-08-16 PROCEDURE — 98941 CHIROPRACT MANJ 3-4 REGIONS: CPT | Mod: AT | Performed by: CHIROPRACTOR

## 2023-08-16 PROCEDURE — 99212 OFFICE O/P EST SF 10 MIN: CPT | Mod: 25 | Performed by: CHIROPRACTOR

## 2023-08-23 NOTE — PROGRESS NOTES
Subjective Finding:    Chief compalint: Patient presents with:  Back Pain: Neck and lower back pain    , Pain Scale: 6/10, Intensity: sharp, Duration: 1 weeks, Radiating: no.    Date of injury:     Activities that the pain restricts:   Home/household/hobbies/social activities: yes.  Work duties: no.  Sleep: no.  Makes symptoms better: rest.  Makes symptoms worse: activity, cervical extension and cervical flexion.  Have you seen anyone else for the symptoms? No.  Work related: no.  Automobile related injury: no.    Objective and Assessment:    Posture Analysis:   High shoulder: .  Head tilt: .  High iliac crest: .  Head carriage: forward.  Thoracic Kyphosis: neutral.  Lumbar Lordosis: neutral.    Lumbar Range of Motion: .  Cervical Range of Motion: extension decreased, left lateral flexion decreased and right lateral flexion decreased.  Thoracic Range of Motion: .  Extremity Range of Motion: .    Palpation:   Traps: sharp pain and stiff, referred pain: no    Segmental dysfunction pre-treatment and treatment area: C23  T1  L4.    Assessment post-treatment:  Cervical: ROM increased.  Thoracic: ROM increased.  Lumbar: .    Comments: .      Complicating Factors: .    Procedure(s):  [facility]:  [address] Chiropractic manipulative treatment 3 regions performed   Cervical: Diversified, See above for level, Supine and Thoracic: Diversified, See above for level, Prone  Lumbar        Modalities:  None performed this visit    Therapeutic procedures:  None    Plan:  Treatment plan:  2 times per week for 2 weeks.  Instructed patient: stretch as instructed at visit.  Short term goals: increase ROM and improve ADL.  Long term goals: restore normal function.  Prognosis: excellent.

## 2023-08-31 ENCOUNTER — TELEPHONE (OUTPATIENT)
Dept: PEDIATRICS | Facility: OTHER | Age: 23
End: 2023-08-31

## 2023-08-31 NOTE — TELEPHONE ENCOUNTER
Okay to schedule sooner than 6 weeks, but for acute visit only. Please assure patient that she will still have a complete evaluation for her concerns. May schedule a preventative visit at a future date.     Patient was scheduled on 5/22/23, 7/28/23 and 8/11/23 for same complaint, but did not show up for her appointments.

## 2023-08-31 NOTE — TELEPHONE ENCOUNTER
Patient is on the phone stating she has not had a period over 1 year. Patient is wondering if she could get a physical done. Patient wants to schedule a Physical with [facility]. Would this have to be scheduled out the six weeks. Please call patient back at [phone]

## 2023-09-26 DIAGNOSIS — R79.0 LOW SERUM FERRITIN LEVEL: ICD-10-CM

## 2023-09-27 NOTE — TELEPHONE ENCOUNTER
Iron      Last Written Prescription Date:  3/10/23  Last Fill Quantity: 30,   # refills: 4  Last Office Visit: 8/25/22  Future Office visit:

## 2023-09-28 RX ORDER — FERROUS SULFATE 325(65) MG
TABLET ORAL
Qty: 30 TABLET | Refills: 0 | Status: SHIPPED | OUTPATIENT
Start: 2023-09-28 | End: 2023-11-10

## 2023-10-09 DIAGNOSIS — B00.1 RECURRENT COLD SORES: ICD-10-CM

## 2023-10-09 RX ORDER — VALACYCLOVIR HYDROCHLORIDE 500 MG/1
500 TABLET, FILM COATED ORAL DAILY
Qty: 30 TABLET | Refills: 0 | Status: SHIPPED | OUTPATIENT
Start: 2023-10-09 | End: 2023-11-16

## 2023-10-09 NOTE — TELEPHONE ENCOUNTER
Valtrex       Last Written Prescription Date:  8/25/23  Last Fill Quantity: 30,   # refills: 0  Last Office Visit: 8/25/22  Future Office visit:       Routing refill request to provider for review/approval because:

## 2023-10-25 DIAGNOSIS — Z30.8 ENCOUNTER FOR OTHER CONTRACEPTIVE MANAGEMENT: ICD-10-CM

## 2023-10-25 NOTE — TELEPHONE ENCOUNTER
ISIBLOOM 0.15-30 MG-MCG tablet   Last Written Prescription Date:  8/30/21  Last Fill Quantity: 28,   # refills: 11  Last Office Visit: 8/25/22  Future Office visit:       Routing refill request to provider for review/approval because:  Drug not active on patient's medication list

## 2023-10-27 RX ORDER — DESOGESTREL AND ETHINYL ESTRADIOL 0.15-0.03
1 KIT ORAL DAILY
Qty: 28 TABLET | Refills: 0 | Status: SHIPPED | OUTPATIENT
Start: 2023-10-27 | End: 2023-11-16

## 2023-11-09 DIAGNOSIS — R79.0 LOW SERUM FERRITIN LEVEL: ICD-10-CM

## 2023-11-09 NOTE — TELEPHONE ENCOUNTER
Ferosul      Last Written Prescription Date:  9/28/23  Last Fill Quantity: 30,   # refills: 0  Last Office Visit: 8/25/22  Future Office visit:       Routing refill request to provider for review/approval because:

## 2023-11-10 ENCOUNTER — TELEPHONE (OUTPATIENT)
Dept: PEDIATRICS | Facility: OTHER | Age: 23
End: 2023-11-10

## 2023-11-10 RX ORDER — FERROUS SULFATE 325(65) MG
TABLET ORAL
Qty: 30 TABLET | Refills: 0 | OUTPATIENT
Start: 2023-11-10

## 2023-11-10 NOTE — TELEPHONE ENCOUNTER
Patient will not have insurance until 11/20/23.She is not sure when it will go into effect but want's to wait to schedule an appointment until that is ready.    She need's a refill and is wondering if she can get them  filled one more time.      Message oanh from previous encounter      Keri Kemp APRN CNP     JA    11/10/23  8:04 AM  Note  I will send in the prescription, but change it to take every other day. If she doesn't have insurance, it may be cheaper to buy it over the counter as the dose is the same.

## 2023-11-15 DIAGNOSIS — Z30.8 ENCOUNTER FOR OTHER CONTRACEPTIVE MANAGEMENT: ICD-10-CM

## 2023-11-15 DIAGNOSIS — B00.1 RECURRENT COLD SORES: ICD-10-CM

## 2023-11-16 RX ORDER — DESOGESTREL AND ETHINYL ESTRADIOL 0.15-0.03
1 KIT ORAL DAILY
Qty: 28 TABLET | Refills: 0 | Status: SHIPPED | OUTPATIENT
Start: 2023-11-16 | End: 2023-12-15

## 2023-11-16 RX ORDER — VALACYCLOVIR HYDROCHLORIDE 500 MG/1
500 TABLET, FILM COATED ORAL DAILY
Qty: 30 TABLET | Refills: 0 | Status: SHIPPED | OUTPATIENT
Start: 2023-11-16 | End: 2024-01-04

## 2023-11-16 NOTE — TELEPHONE ENCOUNTER
Isibloom      Last Written Prescription Date:  10/27/23  Last Fill Quantity: 28,   # refills: 0  Last Office Visit: 8/25/22  Future Office visit:       Routing refill request to provider for review/approval because:      Valtrex      Last Written Prescription Date:  10/9/23  Last Fill Quantity: 30,   # refills: 0  Last Office Visit: 8/25/22  Future Office visit:       Routing refill request to provider for review/approval because:

## 2023-11-26 ENCOUNTER — HOSPITAL ENCOUNTER (EMERGENCY)
Facility: HOSPITAL | Age: 23
Discharge: HOME | End: 2023-11-26
Attending: EMERGENCY MEDICINE | Admitting: EMERGENCY MEDICINE

## 2023-11-26 VITALS
HEART RATE: 91 BPM | SYSTOLIC BLOOD PRESSURE: 112 MMHG | RESPIRATION RATE: 16 BRPM | DIASTOLIC BLOOD PRESSURE: 67 MMHG | TEMPERATURE: 96.8 F | OXYGEN SATURATION: 96 %

## 2023-11-26 DIAGNOSIS — F10.920 ALCOHOLIC INTOXICATION WITHOUT COMPLICATION (H): ICD-10-CM

## 2023-11-26 LAB
ALBUMIN SERPL BCG-MCNC: 4.6 G/DL (ref 3.5–5.2)
ALP SERPL-CCNC: 63 U/L (ref 40–150)
ALT SERPL W P-5'-P-CCNC: 27 U/L (ref 0–50)
ANION GAP SERPL CALCULATED.3IONS-SCNC: 18 MMOL/L (ref 7–15)
AST SERPL W P-5'-P-CCNC: 27 U/L (ref 0–45)
BASE EXCESS BLDV CALC-SCNC: -3.8 MMOL/L (ref -7.7–1.9)
BASOPHILS # BLD AUTO: 0 10E3/UL (ref 0–0.2)
BASOPHILS NFR BLD AUTO: 0 %
BILIRUB SERPL-MCNC: 0.2 MG/DL
BUN SERPL-MCNC: 7.4 MG/DL (ref 6–20)
CALCIUM SERPL-MCNC: 9.3 MG/DL (ref 8.6–10)
CHLORIDE SERPL-SCNC: 101 MMOL/L (ref 98–107)
CREAT SERPL-MCNC: 0.64 MG/DL (ref 0.51–0.95)
DEPRECATED HCO3 PLAS-SCNC: 20 MMOL/L (ref 22–29)
EGFRCR SERPLBLD CKD-EPI 2021: >90 ML/MIN/1.73M2
EOSINOPHIL # BLD AUTO: 0 10E3/UL (ref 0–0.7)
EOSINOPHIL NFR BLD AUTO: 0 %
ERYTHROCYTE [DISTWIDTH] IN BLOOD BY AUTOMATED COUNT: 12.3 % (ref 10–15)
ETHANOL SERPL-MCNC: 0.27 G/DL
GLUCOSE SERPL-MCNC: 164 MG/DL (ref 70–99)
HCO3 BLDV-SCNC: 21 MMOL/L (ref 21–28)
HCT VFR BLD AUTO: 43.4 % (ref 35–47)
HGB BLD-MCNC: 14.9 G/DL (ref 11.7–15.7)
IMM GRANULOCYTES # BLD: 0.1 10E3/UL
IMM GRANULOCYTES NFR BLD: 0 %
LYMPHOCYTES # BLD AUTO: 2.7 10E3/UL (ref 0.8–5.3)
LYMPHOCYTES NFR BLD AUTO: 20 %
MCH RBC QN AUTO: 31.8 PG (ref 26.5–33)
MCHC RBC AUTO-ENTMCNC: 34.3 G/DL (ref 31.5–36.5)
MCV RBC AUTO: 93 FL (ref 78–100)
MONOCYTES # BLD AUTO: 0.8 10E3/UL (ref 0–1.3)
MONOCYTES NFR BLD AUTO: 6 %
NEUTROPHILS # BLD AUTO: 9.9 10E3/UL (ref 1.6–8.3)
NEUTROPHILS NFR BLD AUTO: 74 %
NRBC # BLD AUTO: 0 10E3/UL
NRBC BLD AUTO-RTO: 0 /100
O2/TOTAL GAS SETTING VFR VENT: 21 %
OXYHGB MFR BLDV: 96 % (ref 70–75)
PCO2 BLDV: 35 MM HG (ref 40–50)
PH BLDV: 7.38 [PH] (ref 7.32–7.43)
PLATELET # BLD AUTO: 412 10E3/UL (ref 150–450)
PO2 BLDV: 96 MM HG (ref 25–47)
POTASSIUM SERPL-SCNC: 3.5 MMOL/L (ref 3.4–5.3)
PROT SERPL-MCNC: 7.2 G/DL (ref 6.4–8.3)
RBC # BLD AUTO: 4.68 10E6/UL (ref 3.8–5.2)
SODIUM SERPL-SCNC: 139 MMOL/L (ref 135–145)
WBC # BLD AUTO: 13.5 10E3/UL (ref 4–11)

## 2023-11-26 PROCEDURE — 96361 HYDRATE IV INFUSION ADD-ON: CPT

## 2023-11-26 PROCEDURE — 250N000011 HC RX IP 250 OP 636: Mod: JZ | Performed by: EMERGENCY MEDICINE

## 2023-11-26 PROCEDURE — 99284 EMERGENCY DEPT VISIT MOD MDM: CPT | Performed by: EMERGENCY MEDICINE

## 2023-11-26 PROCEDURE — 36415 COLL VENOUS BLD VENIPUNCTURE: CPT | Performed by: EMERGENCY MEDICINE

## 2023-11-26 PROCEDURE — 99284 EMERGENCY DEPT VISIT MOD MDM: CPT | Mod: 25

## 2023-11-26 PROCEDURE — 85025 COMPLETE CBC W/AUTO DIFF WBC: CPT | Performed by: EMERGENCY MEDICINE

## 2023-11-26 PROCEDURE — 82077 ASSAY SPEC XCP UR&BREATH IA: CPT | Performed by: EMERGENCY MEDICINE

## 2023-11-26 PROCEDURE — 258N000003 HC RX IP 258 OP 636: Performed by: EMERGENCY MEDICINE

## 2023-11-26 PROCEDURE — 96374 THER/PROPH/DIAG INJ IV PUSH: CPT

## 2023-11-26 PROCEDURE — 80053 COMPREHEN METABOLIC PANEL: CPT | Performed by: EMERGENCY MEDICINE

## 2023-11-26 PROCEDURE — 82805 BLOOD GASES W/O2 SATURATION: CPT | Performed by: EMERGENCY MEDICINE

## 2023-11-26 RX ORDER — ONDANSETRON 2 MG/ML
4 INJECTION INTRAMUSCULAR; INTRAVENOUS ONCE
Status: COMPLETED | OUTPATIENT
Start: 2023-11-26 | End: 2023-11-26

## 2023-11-26 RX ADMIN — ONDANSETRON 4 MG: 2 INJECTION INTRAMUSCULAR; INTRAVENOUS at 02:12

## 2023-11-26 RX ADMIN — SODIUM CHLORIDE 1000 ML: 9 INJECTION, SOLUTION INTRAVENOUS at 02:11

## 2023-11-26 ASSESSMENT — ENCOUNTER SYMPTOMS
COUGH: 0
FEVER: 0
SHORTNESS OF BREATH: 0
CHILLS: 0

## 2023-11-26 ASSESSMENT — ACTIVITIES OF DAILY LIVING (ADL): ADLS_ACUITY_SCORE: 35

## 2023-11-26 NOTE — ED TRIAGE NOTES
Patient presents to the emergency room via [facility] with complaints of alcohol intoxication and vomiting. Pt told EMS she drank a lot tonight. On arrival pt is vomiting. Answering questions appropriately. Wet clothing removed. Warm blankets placed. MD at bedside.

## 2023-11-26 NOTE — ED NOTES
Patient given verbal and written discharge instructions. Patient verbalized understanding of discharge instructions. Awake and alert. No longer vomiting. Able to ambulate to the bathroom without difficulty. Pt's friend is going to transport pt home.

## 2023-11-26 NOTE — DISCHARGE INSTRUCTIONS
Be careful with how much you drink. Your alcohol level was 3.5 times the legal limit. It is possible to die from alcohol poisoning.

## 2023-11-26 NOTE — ED PROVIDER NOTES
History     Chief Complaint   Patient presents with    Alcohol Intoxication     HPI  Marimar Jarrett is a 23 year old female who is here with alcohol intoxication.  Was drinking with friends in town.  Became too intoxicated.  9 1 was called.  They brought patient here.  Patient denies falling, any assault, states she is nauseated and needs a back to vomit and.    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     Priority: Medium    Adenotonsillar hypertrophy 08/16/2013     Priority: Medium        Past Medical History:    Past Medical History:   Diagnosis Date    Streptococcal pharyngitis        Past Surgical History:    Past Surgical History:   Procedure Laterality Date    TONSILLECTOMY, ADENOIDECTOMY, COMBINED  8/22/2013    Procedure: COMBINED TONSILLECTOMY, ADENOIDECTOMY;  TONSILLECTOMY AND ADENOIDECTOMY;  Surgeon: Cherie Hodgson DO;  Location: [facility]    wisdom teeth  11/2021       Family History:    Family History   Problem Relation Age of Onset    Circulatory Mother     Cancer Father     Diabetes Father        Social History:  Marital Status:  Single [1]  Social History     Tobacco Use    Smoking status: Never    Smokeless tobacco: Never   Vaping Use    Vaping Use: Never used   Substance Use Topics    Alcohol use: No    Drug use: No        Medications:    ferrous sulfate (FEROSUL) 325 (65 Fe) MG tablet  fluconazole (DIFLUCAN) 150 MG tablet  FLUoxetine (PROZAC) 10 MG capsule  hydrOXYzine (ATARAX) 25 MG tablet  ISIBLOOM 0.15-30 MG-MCG tablet  medical cannabis (Patient's own supply)  OLANZapine (ZYPREXA) 5 MG tablet  traZODone (DESYREL) 50 MG tablet  valACYclovir (VALTREX) 500 MG tablet          Review of Systems   Constitutional:  Negative for chills and fever.    Respiratory:  Negative for cough and shortness of breath.    All other systems reviewed and are negative.      Physical Exam   BP: 123/99  Pulse: 96  Temp: (!) 96.3  F (35.7  C)  Resp: 14  SpO2: 98 %      Physical Exam  Constitutional:       General: She is not in acute distress.     Appearance: She is not diaphoretic.   HENT:      Head: Normocephalic and atraumatic.      Right Ear: External ear normal.      Left Ear: External ear normal.      Nose: No congestion or rhinorrhea.      Mouth/Throat:      Pharynx: Oropharynx is clear. No oropharyngeal exudate.   Eyes:      General: No scleral icterus.     Pupils: Pupils are equal, round, and reactive to light.   Cardiovascular:      Rate and Rhythm: Normal rate and regular rhythm.      Heart sounds: Normal heart sounds.   Pulmonary:      Effort: No respiratory distress.      Breath sounds: Normal breath sounds.   Abdominal:      General: Bowel sounds are normal.      Palpations: Abdomen is soft.      Tenderness: There is no abdominal tenderness.   Musculoskeletal:         General: No tenderness.      Cervical back: Normal range of motion and neck supple.      Right lower leg: No edema.      Left lower leg: No edema.   Skin:     General: Skin is warm.      Capillary Refill: Capillary refill takes less than 2 seconds.      Findings: No rash.   Neurological:      General: No focal deficit present.      Comments: Intoxicated   Psychiatric:      Comments: Tearful, remorseful         ED Course                 Procedures             Critical Care time:               Results for orders placed or performed during the hospital encounter of 11/26/23 (from the past 24 hour(s))   CBC with platelets differential    Narrative    The following orders were created for panel order CBC with platelets differential.  Procedure                               Abnormality         Status                     ---------                               -----------         ------                      CBC with platelets and d...[[id number]]  Abnormal            Final result                 Please view results for these tests on the individual orders.   Comprehensive metabolic panel   Result Value Ref Range    Sodium 139 135 - 145 mmol/L    Potassium 3.5 3.4 - 5.3 mmol/L    Carbon Dioxide (CO2) 20 (L) 22 - 29 mmol/L    Anion Gap 18 (H) 7 - 15 mmol/L    Urea Nitrogen 7.4 6.0 - 20.0 mg/dL    Creatinine 0.64 0.51 - 0.95 mg/dL    GFR Estimate >90 >60 mL/min/1.73m2    Calcium 9.3 8.6 - 10.0 mg/dL    Chloride 101 98 - 107 mmol/L    Glucose 164 (H) 70 - 99 mg/dL    Alkaline Phosphatase 63 40 - 150 U/L    AST 27 0 - 45 U/L    ALT 27 0 - 50 U/L    Protein Total 7.2 6.4 - 8.3 g/dL    Albumin 4.6 3.5 - 5.2 g/dL    Bilirubin Total 0.2 <=1.2 mg/dL   Blood gas venous and oxyhgb   Result Value Ref Range    pH Venous 7.38 7.32 - 7.43    pCO2 Venous 35 (L) 40 - 50 mm Hg    pO2 Venous 96 (H) 25 - 47 mm Hg    Bicarbonate Venous 21 21 - 28 mmol/L    FIO2 21     Oxyhemoglobin Venous 96 (H) 70 - 75 %    Base Excess/Deficit -3.8 -7.7 - 1.9 mmol/L   Alcohol ethyl   Result Value Ref Range    Alcohol ethyl 0.27 (H) <=0.01 g/dL   CBC with platelets and differential   Result Value Ref Range    WBC Count 13.5 (H) 4.0 - 11.0 10e3/uL    RBC Count 4.68 3.80 - 5.20 10e6/uL    Hemoglobin 14.9 11.7 - 15.7 g/dL    Hematocrit 43.4 35.0 - 47.0 %    MCV 93 78 - 100 fL    MCH 31.8 26.5 - 33.0 pg    MCHC 34.3 31.5 - 36.5 g/dL    RDW 12.3 10.0 - 15.0 %    Platelet Count 412 150 - 450 10e3/uL    % Neutrophils 74 %    % Lymphocytes 20 %    % Monocytes 6 %    % Eosinophils 0 %    % Basophils 0 %    % Immature Granulocytes 0 %    NRBCs per 100 WBC 0 <1 /100    Absolute Neutrophils 9.9 (H) 1.6 - 8.3 10e3/uL    Absolute Lymphocytes 2.7 0.8 - 5.3 10e3/uL    Absolute Monocytes 0.8 0.0 - 1.3 10e3/uL    Absolute Eosinophils 0.0 0.0 - 0.7 10e3/uL    Absolute Basophils 0.0 0.0 - 0.2 10e3/uL    Absolute Immature Granulocytes 0.1 <=0.4 10e3/uL    Absolute NRBCs 0.0  10e3/uL       Medications   ondansetron (ZOFRAN) injection 4 mg (4 mg Intravenous $Given 11/26/23 0212)   sodium chloride 0.9% BOLUS 1,000 mL (0 mLs Intravenous Stopped 11/26/23 0316)       Assessments & Plan (with Medical Decision Making)     I have reviewed the nursing notes.    I have reviewed the findings, diagnosis, plan and need for follow up with the patient.          Medical Decision Making  The patient's presentation was of low complexity (2+ clearly self-limited or minor problems).    The patient's evaluation involved:  an assessment requiring an independent historian (ems)  ordering and/or review of 3+ test(s) in this encounter (see separate area of note for details)    The patient's management necessitated moderate risk (prescription drug management including medications given in the ED).    3-year-old female here with alcohol intoxication.  Protecting her airway.  Vomiting resolved after Zofran.  Was observed for several hours, was becoming clinically sober then was able to get a ride home, to her parents house, with a friend.  Friend was observed to come in and take patient.  Inquired if anybody will be home who could pose her to her, denies.    Discharge Medication List as of 11/26/2023  4:05 AM          Final diagnoses:   Alcoholic intoxication without complication (H24)       11/26/2023   [facility]       Bret Alejandro MD  11/26/23 3567

## 2023-12-14 DIAGNOSIS — Z30.8 ENCOUNTER FOR OTHER CONTRACEPTIVE MANAGEMENT: ICD-10-CM

## 2023-12-14 NOTE — TELEPHONE ENCOUNTER
Isibloom      Last Written Prescription Date:  11/16/23  Last Fill Quantity: 28,   # refills: 0  Last Office Visit: 05/22/23  Future Office visit:

## 2023-12-15 RX ORDER — DESOGESTREL AND ETHINYL ESTRADIOL 0.15-0.03
1 KIT ORAL DAILY
Qty: 84 TABLET | Refills: 0 | Status: SHIPPED | OUTPATIENT
Start: 2023-12-15 | End: 2024-03-08

## 2024-01-03 DIAGNOSIS — B00.1 RECURRENT COLD SORES: ICD-10-CM

## 2024-01-04 RX ORDER — VALACYCLOVIR HYDROCHLORIDE 500 MG/1
500 TABLET, FILM COATED ORAL DAILY
Qty: 30 TABLET | Refills: 0 | Status: SHIPPED | OUTPATIENT
Start: 2024-01-04 | End: 2024-02-06

## 2024-01-04 NOTE — TELEPHONE ENCOUNTER
Valtrex      Last Written Prescription Date:  11/16/23  Last Fill Quantity: 30,   # refills: 0  Last Office Visit: 8/25/22  Future Office visit:       Routing refill request to provider for review/approval because:

## 2024-02-01 DIAGNOSIS — R79.0 LOW SERUM FERRITIN LEVEL: ICD-10-CM

## 2024-02-01 RX ORDER — FERROUS SULFATE 325(65) MG
TABLET ORAL
Qty: 30 TABLET | Refills: 0 | Status: SHIPPED | OUTPATIENT
Start: 2024-02-01

## 2024-02-01 NOTE — TELEPHONE ENCOUNTER
Ferosul      Last Written Prescription Date:  11/10/23  Last Fill Quantity: 30,   # refills: 0  Last Office Visit: 8/25/22  Future Office visit:       Routing refill request to provider for review/approval because:

## 2024-02-06 DIAGNOSIS — B00.1 RECURRENT COLD SORES: ICD-10-CM

## 2024-02-06 RX ORDER — VALACYCLOVIR HYDROCHLORIDE 500 MG/1
500 TABLET, FILM COATED ORAL DAILY
Qty: 30 TABLET | Refills: 6 | Status: SHIPPED | OUTPATIENT
Start: 2024-02-06

## 2024-02-06 NOTE — TELEPHONE ENCOUNTER
Valtrex      Last Written Prescription Date:  1/4/24  Last Fill Quantity: 30,   # refills: 0  Last Office Visit: 8/25/22  Future Office visit:       Routing refill request to provider for review/approval because:

## 2024-03-08 DIAGNOSIS — Z30.8 ENCOUNTER FOR OTHER CONTRACEPTIVE MANAGEMENT: ICD-10-CM

## 2024-03-08 RX ORDER — DESOGESTREL AND ETHINYL ESTRADIOL 0.15-0.03
1 KIT ORAL DAILY
Qty: 84 TABLET | Refills: 0 | Status: SHIPPED | OUTPATIENT
Start: 2024-03-08 | End: 2024-05-13

## 2024-03-08 NOTE — TELEPHONE ENCOUNTER
Disp Refills Start End DENISSE   ISIBLOOM 0.15-30 MG-MCG tablet 84 tablet 0 12/15/2023 -- No     Last Office Visit: 02/21/2022  Future Office visit:       Routing refill request to provider for review/approval because:

## 2024-05-13 DIAGNOSIS — Z30.8 ENCOUNTER FOR OTHER CONTRACEPTIVE MANAGEMENT: ICD-10-CM

## 2024-05-13 RX ORDER — DESOGESTREL AND ETHINYL ESTRADIOL 0.15-0.03
1 KIT ORAL DAILY
Qty: 84 TABLET | Refills: 0 | Status: SHIPPED | OUTPATIENT
Start: 2024-05-13 | End: 2024-08-12

## 2024-05-13 NOTE — TELEPHONE ENCOUNTER
Isibloom      Last Written Prescription Date:  3/8/2024  Last Fill Quantity: 84,   # refills: 0  Last Office Visit: 8/25/2022  Future Office visit:       Routing refill request to provider for review/approval because:  Drug not on the [facility], [facility] or [facility] refill protocol or controlled substance

## 2024-05-30 ENCOUNTER — HOSPITAL ENCOUNTER (EMERGENCY)
Facility: HOSPITAL | Age: 24
Discharge: HOME | End: 2024-05-30
Attending: NURSE PRACTITIONER | Admitting: NURSE PRACTITIONER
Payer: COMMERCIAL

## 2024-05-30 ENCOUNTER — APPOINTMENT (OUTPATIENT)
Dept: GENERAL RADIOLOGY | Facility: HOSPITAL | Age: 24
End: 2024-05-30
Attending: NURSE PRACTITIONER
Payer: COMMERCIAL

## 2024-05-30 VITALS
SYSTOLIC BLOOD PRESSURE: 134 MMHG | HEIGHT: 62 IN | TEMPERATURE: 98.2 F | WEIGHT: 140 LBS | BODY MASS INDEX: 25.76 KG/M2 | HEART RATE: 102 BPM | DIASTOLIC BLOOD PRESSURE: 89 MMHG | OXYGEN SATURATION: 100 %
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
## 2024-05-30 DIAGNOSIS — S63.501A WRIST SPRAIN, RIGHT, INITIAL ENCOUNTER: ICD-10-CM

## 2024-05-30 DIAGNOSIS — S71.151A DOG BITE OF RIGHT THIGH, INITIAL ENCOUNTER: ICD-10-CM

## 2024-05-30 PROCEDURE — 250N000009 HC RX 250: Performed by: NURSE PRACTITIONER

## 2024-05-30 PROCEDURE — G0463 HOSPITAL OUTPT CLINIC VISIT: HCPCS

## 2024-05-30 PROCEDURE — 73110 X-RAY EXAM OF WRIST: CPT | Mod: TC,RT

## 2024-05-30 PROCEDURE — 99213 OFFICE O/P EST LOW 20 MIN: CPT | Performed by: NURSE PRACTITIONER

## 2024-05-30 PROCEDURE — 250N000013 HC RX MED GY IP 250 OP 250 PS 637: Performed by: NURSE PRACTITIONER

## 2024-05-30 RX ORDER — GINSENG 100 MG
CAPSULE ORAL ONCE
Status: COMPLETED | OUTPATIENT
Start: 2024-05-30 | End: 2024-05-30

## 2024-05-30 RX ORDER — IBUPROFEN 800 MG/1
800 TABLET, FILM COATED ORAL ONCE
Status: COMPLETED | OUTPATIENT
Start: 2024-05-30 | End: 2024-05-30

## 2024-05-30 RX ADMIN — IBUPROFEN 800 MG: 800 TABLET ORAL at 20:08

## 2024-05-30 RX ADMIN — BACITRACIN: 500 OINTMENT TOPICAL at 20:09

## 2024-05-30 RX ADMIN — AMOXICILLIN AND CLAVULANATE POTASSIUM 1 TABLET: 875; 125 TABLET, FILM COATED ORAL at 20:08

## 2024-05-30 ASSESSMENT — ACTIVITIES OF DAILY LIVING (ADL): ADLS_ACUITY_SCORE: 35

## 2024-05-30 NOTE — PROGRESS NOTES
"  Assessment & Plan     Other depression  Doing well. Follows with Samreen Saavedra CNP.    Anxiety      Recurrent cold sores  Valtrex daily, has refills available.     Dog bite of right thigh, subsequent encounter  Healing, no s/s of infection. Complete course of Augmentin. May use Vaseline or Aquaphor to the wound to aid healing.    Immunization due    - HPV 9Y+ (Gardasil 9)  - POLIOVIRUS 6W-18Y (IPOL)    Routine general medical examination at a health care facility  Routine screenings completed. Lipid profile is normal, repeat in 5 years. STI, hepatitis C screenings are pending.  - Hepatitis C Screen Reflex to HCV RNA Quant and Genotype  - Chlamydia trachomatis/Neisseria gonorrhoeae by PCR  - Lipid Profile (Chol, Trig, HDL, LDL calc)    32 minutes spent on the date of the encounter doing chart review, history and exam, documentation and further activities per the note      BMI  Estimated body mass index is 27.25 kg/m  as calculated from the following:    Height as of 5/30/24: 1.575 m (5' 2\").    Weight as of this encounter: 67.6 kg (149 lb).       Return for follow up as needed, due for preventative visit in 1 year.    Lisette Minaya is a 24 year old, presenting for the following health issues:  Anxiety, Depression, and Recheck Medication        6/3/2024     2:01 PM   Additional Questions   Roomed by Mary MAYS   Accompanied by self     History of Present Illness       Reason for visit:  Check in    She eats 2-3 servings of fruits and vegetables daily.She consumes 2 sweetened beverage(s) daily.She exercises with enough effort to increase her heart rate 60 or more minutes per day.  She exercises with enough effort to increase her heart rate 7 days per week. She is missing 3 dose(s) of medications per week.     Seeing Samreen LOCKE (formerly of [facility], now in the [facility]) virtually for medication management. Depression and anxiety is better controlled with current medications and medical cannabis. Not currently in " therapy.    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 Living by herself with 2 cats. Enjoys living on her own.     Was recently in  on 5/30 for a dog bite to her thigh and a wrist sprain. Her wrist is feeling better, but her thigh is still painful. Taking Augmentin as ordered.    Due for preventative visit and PAP.       Depression and Anxiety   How are you doing with your depression since your last visit? No change doing good   How are you doing with your anxiety since your last visit?  No change doing good   Are you having other symptoms that might be associated with depression or anxiety? No  Have you had a significant life event? No   Do you have any concerns with your use of alcohol or other drugs? No    Social History     Tobacco Use    Smoking status: Never    Smokeless tobacco: Never   Vaping Use    Vaping status: Never Used   Substance Use Topics    Alcohol use: Yes     Comment: drinks once or twice per month    Drug use: Yes     Types: Marijuana     Comment: Medical cannabis daily         2/21/2022     3:00 PM 6/15/2022     8:00 AM 8/25/2022     7:00 AM   PHQ   PHQ-9 Total Score 24 24 24   Q9: Thoughts of better off dead/self-harm past 2 weeks Not at all Not at all Several days         6/15/2022     8:00 AM 8/25/2022     7:00 AM 6/3/2024     1:56 PM   ANAYA-7 SCORE   Total Score   7 (mild anxiety)   Total Score 21 21 7         8/25/2022     7:00 AM   Last PHQ-9   1.  Little interest or pleasure in doing things 3   2.  Feeling down, depressed, or hopeless 3   3.  Trouble falling or staying asleep, or sleeping too much 3   4.  Feeling tired or having little energy 3   5.  Poor appetite or overeating 2   6.  Feeling bad about yourself 3   7.  Trouble concentrating 3   8.  Moving slowly or restless 3   Q9: Thoughts of better off dead/self-harm past 2 weeks 1   PHQ-9 Total Score 24   Difficulty at work, home, or with  people Not difficult at all         6/3/2024     1:56 PM   ANAYA-7    1. Feeling nervous, anxious, or on edge 1   2. Not being able to stop or control worrying 1   3. Worrying too much about different things 1   4. Trouble relaxing 1   5. Being so restless that it is hard to sit still 1   6. Becoming easily annoyed or irritable 1   7. Feeling afraid, as if something awful might happen 1   ANAYA-7 Total Score 7   If you checked any problems, how difficult have they made it for you to do your work, take care of things at home, or get along with other people? Not difficult at all       Suicide Assessment Five-step Evaluation and Treatment (SAFE-T)          Review of Systems  Constitutional, HEENT, cardiovascular, pulmonary, gi and gu systems are negative, except as otherwise noted.      Objective    /64 (BP Location: Right arm, Patient Position: Chair, Cuff Size: Adult Regular)   Pulse 84   Temp 97.6  F (36.4  C) (Tympanic)   Resp 16   Wt 67.6 kg (149 lb)   LMP 05/27/2024 (Approximate)   SpO2 98%   BMI 27.25 kg/m    Body mass index is 27.25 kg/m .  Physical Exam   GENERAL: alert and no distress  EYES: Eyes grossly normal to inspection, PERRL and conjunctivae and sclerae normal  HENT: ear canals and TM's normal, nose and mouth without ulcers or lesions  NECK: no adenopathy, no asymmetry, masses, or scars  RESP: lungs clear to auscultation - no rales, rhonchi or wheezes  CV: regular rate and rhythm, normal S1 S2, no S3 or S4, no murmur, click or rub, no peripheral edema  ABDOMEN: soft, nontender, no hepatosplenomegaly, no masses and bowel sounds normal  MS: no gross musculoskeletal defects noted, no edema  SKIN: Healing dog bite to right thigh with ecchymosis, no erythema, edema, or drainage.   PSYCH: mentation appears normal, affect normal/bright    Results for orders placed or performed in visit on 06/03/24 (from the past 24 hour(s))   Lipid Profile (Chol, Trig, HDL, LDL calc)   Result Value Ref Range     Cholesterol 171 <200 mg/dL    Triglycerides 62 <150 mg/dL    Direct Measure HDL 64 >=50 mg/dL    LDL Cholesterol Calculated 95 <=100 mg/dL    Non HDL Cholesterol 107 <130 mg/dL    Patient Fasting > 8hrs? Yes     Narrative    Cholesterol  Desirable:  <200 mg/dL    Triglycerides  Normal:  Less than 150 mg/dL  Borderline High:  150-199 mg/dL  High:  200-499 mg/dL  Very High:  Greater than or equal to 500 mg/dL    Direct Measure HDL  Female:  Greater than or equal to 50 mg/dL   Male:  Greater than or equal to 40 mg/dL    LDL Cholesterol  Desirable:  <100mg/dL  Above Desirable:  100-129 mg/dL   Borderline High:  130-159 mg/dL   High:  160-189 mg/dL   Very High:  >= 190 mg/dL    Non HDL Cholesterol  Desirable:  130 mg/dL  Above Desirable:  130-159 mg/dL  Borderline High:  160-189 mg/dL  High:  190-219 mg/dL  Very High:  Greater than or equal to 220 mg/dL           Signed Electronically by: KENDRICK Oakes CNP

## 2024-05-31 ASSESSMENT — ENCOUNTER SYMPTOMS
FATIGUE: 0
ACTIVITY CHANGE: 0
WOUND: 1
FEVER: 0
HEADACHES: 0
ARTHRALGIAS: 1
APPETITE CHANGE: 0
DIZZINESS: 0

## 2024-05-31 NOTE — ED TRIAGE NOTES
Pt presents with concerns of dog bite. Pt reports roller skating and being bit by a dog. Incident occurred approx. 1 hour ago

## 2024-05-31 NOTE — ED PROVIDER NOTES
History     Chief Complaint   Patient presents with    Dog Bite     HPI  Marimar Jarrett is a 24 year old female who presents today with a chief complaint of a right thigh dog bite.  She reports she was rollerblading home from the gym and a dog of unknown species came out of the blue and bit her in the right thigh.  She incidentally fell to the ground on her outstretched hands and sustained a right wrist injury.  No dog bite to the right wrist.  She denies hitting her head.  Law enforcement called and the owner was identified.  The owner reports that the dog is up-to-date with all vaccinations including rabies.     She is up-to-date with tetanus as of 2020.    No numbness, tingling, bleeding.    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     Priority: Medium    Adenotonsillar hypertrophy 08/16/2013     Priority: Medium        Past Medical History:    Past Medical History:   Diagnosis Date    Streptococcal pharyngitis        Past Surgical History:    Past Surgical History:   Procedure Laterality Date    TONSILLECTOMY, ADENOIDECTOMY, COMBINED  8/22/2013    Procedure: COMBINED TONSILLECTOMY, ADENOIDECTOMY;  TONSILLECTOMY AND ADENOIDECTOMY;  Surgeon: Cherie Hodgson DO;  Location: [facility]    wisdom teeth  11/2021       Family History:    Family History   Problem Relation Age of Onset    Circulatory Mother     Cancer Father     Diabetes Father        Social History:  Marital Status:  Single [1]  Social History     Tobacco Use    Smoking status: Never    Smokeless tobacco: Never   Vaping Use    Vaping status: Never Used   Substance Use Topics    Alcohol use: No    Drug use: No        Medications:    amoxicillin-clavulanate (AUGMENTIN) 875-125 MG  "tablet  amphetamine-dextroamphetamine (ADDERALL) 10 MG tablet  FEROSUL 325 (65 Fe) MG tablet  FLUoxetine (PROZAC) 10 MG capsule  hydrOXYzine (ATARAX) 25 MG tablet  ISIBLOOM 0.15-30 MG-MCG tablet  medical cannabis (Patient's own supply)  OLANZapine (ZYPREXA) 5 MG tablet  traZODone (DESYREL) 50 MG tablet  valACYclovir (VALTREX) 500 MG tablet  VYVANSE 60 MG capsule  fluconazole (DIFLUCAN) 150 MG tablet          Review of Systems   Constitutional:  Negative for activity change, appetite change, fatigue and fever.   Musculoskeletal:  Positive for arthralgias.   Skin:  Positive for wound.   Neurological:  Negative for dizziness and headaches.       Physical Exam   BP: 134/89  Pulse: 102  Temp: 98.2  F (36.8  C)  Height: 157.5 cm (5' 2\")  Weight: 63.5 kg (140 lb)  SpO2: 100 %      Physical Exam  Vitals and nursing note reviewed.   HENT:      Head: Normocephalic and atraumatic.   Cardiovascular:      Rate and Rhythm: Normal rate.   Pulmonary:      Effort: Pulmonary effort is normal.   Musculoskeletal:      Right wrist: Bony tenderness (generalized over carpals) present. No deformity. Normal range of motion. Normal pulse.      Left wrist: Tenderness (mild inferior hand and wrist, palmar surface) present. No deformity. Normal range of motion. Normal pulse.   Skin:         Neurological:      Mental Status: She is alert.         Right lateral thigh      ED Course     Wound cleansed with soap and tap water, dressed with bacitracin and sterile guaze..    Medications   amoxicillin-clavulanate (AUGMENTIN) 875-125 MG per tablet 1 tablet (1 tablet Oral $Given 5/30/24 2008)   ibuprofen (ADVIL/MOTRIN) tablet 800 mg (800 mg Oral $Given 5/30/24 2008)   bacitracin ointment ( Topical $Given 5/30/24 2009)     Results for orders placed or performed during the hospital encounter of 05/30/24   XR Wrist Right G/E 3 Views     Status: None    Narrative    Exam: XR WRIST RIGHT G/E 3 VIEWS     History:Female, age 24 years, FOOSH    Comparison:  No " relevant prior imaging.    Technique: Four views are submitted.    Findings: Bones are normally mineralized. No evidence of acute or  subacute fracture.  No evidence of dislocation.           Impression    Impression:  No evidence of acute or subacute bony abnormality.     This report is in agreement with the preliminary report.    ALEKSANDR MORRIS MD         SYSTEM ID:  [id number]     Fitted with right wrist splint     Assessments & Plan (with Medical Decision Making)     I have reviewed the nursing notes.    I have reviewed the findings, diagnosis, plan and need for follow up with the patient.    Medical Decision Making  The patient's presentation was of straightforward complexity (a clearly self-limited or minor problem).    The patient's evaluation involved:  history and exam without other MDM data elements    The patient's management necessitated moderate risk (prescription drug management including medications given in the ED).        Discharge Medication List as of 5/30/2024  8:31 PM        START taking these medications    Details   amoxicillin-clavulanate (AUGMENTIN) 875-125 MG tablet Take 1 tablet by mouth 2 times daily for 5 days, Disp-10 tablet, R-0, E-Prescribe             Final diagnoses:   Wrist sprain, right, initial encounter   Dog bite of right thigh, initial encounter     Keep dressing in place for the next 48 hours, do not soak the wound or swim.     Wash wounds 1-2 times daily with soap and water and cover with antiseptic ointment   Watch for signs and symptoms of infection including redness, swelling, increasing pain, drainage, odor, warmth, fever/chills   Return to PCP, ED or UC if this develops   Take all medications as prescribed   Pets fully vaccinated against rabies are at very low risk of rabies infection but    because human rabies is almost always fatal, any biting pet should be confined for 10 days.     If the animal is healthy at the end of the 10 days, there is no danger of rabies.  If  "the animal becomes ill or dies in the 10 day period,    contact animal control so the animal can be tested for rabies.     Return to ED/UC if symptoms increase or concerns develop such as those discussed and listed on the \"When to go the Emergency Room\" portion of your discharge instructions.     Patient verbally educated and given appropriate education sheets for their diagnoses and all questions answered to the best of my ability.       5/30/2024   [facility]       Karyna Nath NP  06/02/24 [id number]    "

## 2024-06-03 ENCOUNTER — OFFICE VISIT (OUTPATIENT)
Dept: PEDIATRICS | Facility: OTHER | Age: 24
End: 2024-06-03
Attending: NURSE PRACTITIONER

## 2024-06-03 VITALS
SYSTOLIC BLOOD PRESSURE: 120 MMHG | HEART RATE: 84 BPM | BODY MASS INDEX: 27.25 KG/M2 | OXYGEN SATURATION: 98 % | WEIGHT: 149 LBS | TEMPERATURE: 97.6 F | RESPIRATION RATE: 16 BRPM | DIASTOLIC BLOOD PRESSURE: 64 MMHG

## 2024-06-03 DIAGNOSIS — F41.9 ANXIETY: ICD-10-CM

## 2024-06-03 DIAGNOSIS — F32.89 OTHER DEPRESSION: Primary | ICD-10-CM

## 2024-06-03 DIAGNOSIS — Z23 IMMUNIZATION DUE: ICD-10-CM
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
## 2024-06-03 DIAGNOSIS — Z00.00 ROUTINE GENERAL MEDICAL EXAMINATION AT A HEALTH CARE FACILITY: ICD-10-CM

## 2024-06-03 DIAGNOSIS — B00.1 RECURRENT COLD SORES: ICD-10-CM

## 2024-06-03 DIAGNOSIS — S71.151D DOG BITE OF RIGHT THIGH, SUBSEQUENT ENCOUNTER: ICD-10-CM

## 2024-06-03 LAB
C TRACH DNA SPEC QL PROBE+SIG AMP: NEGATIVE
CHOLEST SERPL-MCNC: 171 MG/DL
FASTING STATUS PATIENT QL REPORTED: YES
HDLC SERPL-MCNC: 64 MG/DL
LDLC SERPL CALC-MCNC: 95 MG/DL
N GONORRHOEA DNA SPEC QL NAA+PROBE: NEGATIVE
NONHDLC SERPL-MCNC: 107 MG/DL
TRIGL SERPL-MCNC: 62 MG/DL

## 2024-06-03 PROCEDURE — G0463 HOSPITAL OUTPT CLINIC VISIT: HCPCS

## 2024-06-03 PROCEDURE — 90471 IMMUNIZATION ADMIN: CPT

## 2024-06-03 PROCEDURE — 90472 IMMUNIZATION ADMIN EACH ADD: CPT

## 2024-06-03 PROCEDURE — 36415 COLL VENOUS BLD VENIPUNCTURE: CPT | Mod: ZL | Performed by: NURSE PRACTITIONER

## 2024-06-03 PROCEDURE — 80061 LIPID PANEL: CPT | Mod: ZL | Performed by: NURSE PRACTITIONER

## 2024-06-03 PROCEDURE — 86803 HEPATITIS C AB TEST: CPT | Performed by: NURSE PRACTITIONER

## 2024-06-03 PROCEDURE — 99214 OFFICE O/P EST MOD 30 MIN: CPT | Performed by: NURSE PRACTITIONER

## 2024-06-03 PROCEDURE — 90713 POLIOVIRUS IPV SC/IM: CPT

## 2024-06-03 PROCEDURE — 87491 CHLMYD TRACH DNA AMP PROBE: CPT | Mod: ZL | Performed by: NURSE PRACTITIONER

## 2024-06-03 ASSESSMENT — ANXIETY QUESTIONNAIRES
5. BEING SO RESTLESS THAT IT IS HARD TO SIT STILL: SEVERAL DAYS
3. WORRYING TOO MUCH ABOUT DIFFERENT THINGS: SEVERAL DAYS
2. NOT BEING ABLE TO STOP OR CONTROL WORRYING: SEVERAL DAYS
1. FEELING NERVOUS, ANXIOUS, OR ON EDGE: SEVERAL DAYS
7. FEELING AFRAID AS IF SOMETHING AWFUL MIGHT HAPPEN: SEVERAL DAYS
6. BECOMING EASILY ANNOYED OR IRRITABLE: SEVERAL DAYS
8. IF YOU CHECKED OFF ANY PROBLEMS, HOW DIFFICULT HAVE THESE MADE IT FOR YOU TO DO YOUR WORK, TAKE CARE OF THINGS AT HOME, OR GET ALONG WITH OTHER PEOPLE?: NOT DIFFICULT AT ALL
7. FEELING AFRAID AS IF SOMETHING AWFUL MIGHT HAPPEN: SEVERAL DAYS
IF YOU CHECKED OFF ANY PROBLEMS ON THIS QUESTIONNAIRE, HOW DIFFICULT HAVE THESE PROBLEMS MADE IT FOR YOU TO DO YOUR WORK, TAKE CARE OF THINGS AT HOME, OR GET ALONG WITH OTHER PEOPLE: NOT DIFFICULT AT ALL
4. TROUBLE RELAXING: SEVERAL DAYS
GAD7 TOTAL SCORE: 7
GAD7 TOTAL SCORE: 7

## 2024-06-05 LAB — HCV AB SERPL QL IA: NONREACTIVE

## 2024-06-11 ENCOUNTER — TELEPHONE (OUTPATIENT)
Dept: PEDIATRICS | Facility: OTHER | Age: 24
End: 2024-06-11

## 2024-06-11 NOTE — TELEPHONE ENCOUNTER
Symptom or reason needing to speak to RN: ed follow up     Best number to return call: [phone]       Best time to return call: anytime

## 2024-06-11 NOTE — TELEPHONE ENCOUNTER
6/11/2024 4:25 PM  Writer left VM provided call back number regarding ER/UC visit. Two attempts made.   MEHRAN Cantrell, Manohar Nurse

## 2024-07-05 ENCOUNTER — OFFICE VISIT (OUTPATIENT)
Dept: OBGYN | Facility: OTHER | Age: 24
End: 2024-07-05
Attending: NURSE PRACTITIONER
Payer: COMMERCIAL

## 2024-07-05 VITALS
BODY MASS INDEX: 28.07 KG/M2 | OXYGEN SATURATION: 98 % | SYSTOLIC BLOOD PRESSURE: 121 MMHG | HEIGHT: 60 IN | WEIGHT: 143 LBS | DIASTOLIC BLOOD PRESSURE: 70 MMHG | HEART RATE: 92 BPM

## 2024-07-05 DIAGNOSIS — Z12.4 SCREENING FOR MALIGNANT NEOPLASM OF CERVIX: Primary | ICD-10-CM

## 2024-07-05 DIAGNOSIS — Z01.419 WELL WOMAN EXAM WITH ROUTINE GYNECOLOGICAL EXAM: ICD-10-CM

## 2024-07-05 DIAGNOSIS — B37.31 YEAST INFECTION OF THE VAGINA: ICD-10-CM

## 2024-07-05 DIAGNOSIS — Z11.3 SCREEN FOR STD (SEXUALLY TRANSMITTED DISEASE): ICD-10-CM

## 2024-07-05 LAB
BACTERIAL VAGINOSIS VAG-IMP: NEGATIVE
CANDIDA DNA VAG QL NAA+PROBE: DETECTED
CANDIDA GLABRATA / CANDIDA KRUSEI DNA: NOT DETECTED
T VAGINALIS DNA VAG QL NAA+PROBE: NOT DETECTED

## 2024-07-05 PROCEDURE — 87389 HIV-1 AG W/HIV-1&-2 AB AG IA: CPT | Mod: ZL | Performed by: NURSE PRACTITIONER

## 2024-07-05 PROCEDURE — 99395 PREV VISIT EST AGE 18-39: CPT | Performed by: NURSE PRACTITIONER

## 2024-07-05 PROCEDURE — G0123 SCREEN CERV/VAG THIN LAYER: HCPCS | Mod: ZL | Performed by: NURSE PRACTITIONER

## 2024-07-05 PROCEDURE — 0352U MULTIPLEX VAGINAL PANEL BY PCR: CPT | Mod: ZL | Performed by: NURSE PRACTITIONER

## 2024-07-05 PROCEDURE — 86780 TREPONEMA PALLIDUM: CPT | Mod: ZL | Performed by: NURSE PRACTITIONER

## 2024-07-05 PROCEDURE — 36415 COLL VENOUS BLD VENIPUNCTURE: CPT | Mod: ZL | Performed by: NURSE PRACTITIONER

## 2024-07-05 ASSESSMENT — PAIN SCALES - GENERAL: PAINLEVEL: NO PAIN (0)

## 2024-07-05 NOTE — PROGRESS NOTES
CC:  Annual exam  HPI:  Marimar Jarrett is a 24 year old female P0 Patient's last menstrual period was 05/27/2024 (approximate).  Periods are irregular and heavy.   She restarted bcp 2 weeks ago and uses condoms.  Initial pap smear today.  Has completed Gardasil series.  Remainder of her preventative care is provided by her PCP. No other c/o.      Past GYN history:  No STD history  STI testing offered?  Accepted       Last PAP smear:  NA  Mammograms up to date: not applicable    Past Medical History:   Diagnosis Date    Streptococcal pharyngitis        Past Surgical History:   Procedure Laterality Date    TONSILLECTOMY, ADENOIDECTOMY, COMBINED  8/22/2013    Procedure: COMBINED TONSILLECTOMY, ADENOIDECTOMY;  TONSILLECTOMY AND ADENOIDECTOMY;  Surgeon: Cherie Hodgson DO;  Location: [facility] OR    wisdom teeth  11/2021       Family History   Problem Relation Age of Onset    Circulatory Mother     Cancer Father     Diabetes Father        Current Outpatient Medications   Medication Sig Dispense Refill    amphetamine-dextroamphetamine (ADDERALL) 10 MG tablet       FEROSUL 325 (65 Fe) MG tablet TAKE 1 TABLET BY MOUTH EVERY OTHER DAY, AVOID TAKING WITH CALCIUM CONTAINING FOOD OR DRINK 30 tablet 0    FLUoxetine (PROZAC) 10 MG capsule Take 1 capsule (10 mg) by mouth daily 30 capsule 0    ISIBLOOM 0.15-30 MG-MCG tablet TAKE 1 TABLET BY MOUTH DAILY 84 tablet 0    medical cannabis (Patient's own supply) Take as prescribed- vape.      OLANZapine (ZYPREXA) 5 MG tablet Take 1 tablet (5 mg) by mouth daily as needed (associated with anxiety or paranoia) 10 tablet 0    traZODone (DESYREL) 50 MG tablet Take 1 tablet (50 mg) by mouth nightly as needed for sleep 10 tablet 0    valACYclovir (VALTREX) 500 MG tablet TAKE 1 TABLET BY MOUTH DAILY 30 tablet 6    VYVANSE 60 MG capsule Take 60 mg by mouth         Allergies: Patient has no known allergies.    ROS:  CONSTITUTIONAL:NEGATIVE for fever, chills, change in weight  BREAST: NEGATIVE for  masses, tenderness or discharge  : negative for, dysparunia, incontinence, and vaginal discharge    EXAM:  Blood pressure 121/70, pulse 92, height 1.524 m (5'), weight 64.9 kg (143 lb), last menstrual period 05/27/2024, SpO2 98%, not currently breastfeeding.   BMI= Body mass index is 27.93 kg/m .  General - pleasant female in no acute distress.  Breast - no nodularity, asymmetry or nipple discharge bilaterally.  Abdomen - soft, nontender, nondistended, no hepatosplenomegaly.  Pelvic - EG: normal adult female, BUS: within normal limits, Vagina: well rugated, no discharge, Cervix: no lesions or CMT, Uterus: firm, normal sized and nontender, Adnexae: no masses or tenderness.  Rectovaginal - deferred.  Musculoskeletal - no gross deformities.  Neurological - normal strength, sensation, and mental status.      ASSESSMENT/PLAN:  (Z12.4) Screening for malignant neoplasm of cervix  (primary encounter diagnosis)  Comment:   Plan: Gynecologic Cytology (PAP Smear)            (Z01.419) Well woman exam with routine gynecological exam  Comment: remainder of her annual preventative exam completed with PCP  Plan: return annually and as needed    (Z11.3) Screen for STD (sexually transmitted disease)  Comment:   Plan: Treponema Abs w Reflex to RPR and Titer, HIV         Antigen Antibody Combo, Multiplex Vaginal Panel        by PCR            Discussed exercise and healthy eating, including calcium intake.  She should return to the clinic in one year, or sooner if problems arise.

## 2024-07-06 LAB
HIV 1+2 AB+HIV1 P24 AG SERPL QL IA: NONREACTIVE
T PALLIDUM AB SER QL: NONREACTIVE

## 2024-07-09 RX ORDER — FLUCONAZOLE 150 MG/1
150 TABLET ORAL
Qty: 3 TABLET | Refills: 0 | Status: SHIPPED | OUTPATIENT
Start: 2024-07-09 | End: 2024-07-16

## 2024-07-11 LAB
BKR LAB AP GYN ADEQUACY: NORMAL
BKR LAB AP GYN INTERPRETATION: NORMAL
BKR LAB AP HPV REFLEX: NORMAL
BKR LAB AP PREVIOUS ABNORMAL: NORMAL
PATH REPORT.COMMENTS IMP SPEC: NORMAL
PATH REPORT.COMMENTS IMP SPEC: NORMAL
PATH REPORT.RELEVANT HX SPEC: NORMAL

## 2024-08-12 DIAGNOSIS — Z30.8 ENCOUNTER FOR OTHER CONTRACEPTIVE MANAGEMENT: ICD-10-CM

## 2024-08-12 RX ORDER — DESOGESTREL AND ETHINYL ESTRADIOL 0.15-0.03
1 KIT ORAL DAILY
Qty: 84 TABLET | Refills: 0 | Status: SHIPPED | OUTPATIENT
Start: 2024-08-12

## 2024-09-02 ENCOUNTER — MYC REFILL (OUTPATIENT)
Dept: PEDIATRICS | Facility: OTHER | Age: 24
End: 2024-09-02

## 2024-09-02 DIAGNOSIS — F32.89 OTHER DEPRESSION: ICD-10-CM

## 2024-09-03 RX ORDER — FLUOXETINE 10 MG/1
10 CAPSULE ORAL DAILY
Qty: 30 CAPSULE | Refills: 0 | OUTPATIENT
Start: 2024-09-03

## 2024-09-03 RX ORDER — LISDEXAMFETAMINE DIMESYLATE 60 MG/1
60 CAPSULE ORAL
OUTPATIENT
Start: 2024-09-03

## 2024-09-03 RX ORDER — DEXTROAMPHETAMINE SACCHARATE, AMPHETAMINE ASPARTATE, DEXTROAMPHETAMINE SULFATE AND AMPHETAMINE SULFATE 2.5; 2.5; 2.5; 2.5 MG/1; MG/1; MG/1; MG/1
TABLET ORAL
OUTPATIENT
Start: 2024-09-03

## 2024-09-03 NOTE — TELEPHONE ENCOUNTER
Medications managed by Samreen HARDING in the [facility] and patient needs to get refills from managing provider. Please notify patient to contact psychiatry.

## 2024-10-16 DIAGNOSIS — F32.89 OTHER DEPRESSION: ICD-10-CM

## 2024-10-16 RX ORDER — FLUOXETINE 10 MG/1
10 CAPSULE ORAL DAILY
Qty: 30 CAPSULE | Refills: 0 | Status: SHIPPED | OUTPATIENT
Start: 2024-10-16

## 2024-10-16 NOTE — TELEPHONE ENCOUNTER
Fluoxetine (Prozac) 10 mg capsule  Take 1 capsule (10 mg) by mouth daily   Last Written Prescription Date:  3-20-23  Last Fill Quantity: 30 capsule,   # refills: 0  Last Office Visit: 6-3-24  Future Office visit:

## 2024-10-28 DIAGNOSIS — B00.1 RECURRENT COLD SORES: ICD-10-CM

## 2024-10-28 RX ORDER — VALACYCLOVIR HYDROCHLORIDE 500 MG/1
500 TABLET, FILM COATED ORAL DAILY
Qty: 30 TABLET | Refills: 4 | Status: SHIPPED | OUTPATIENT
Start: 2024-10-28

## 2024-11-16 DIAGNOSIS — F32.89 OTHER DEPRESSION: ICD-10-CM

## 2024-11-18 RX ORDER — FLUOXETINE 10 MG/1
10 CAPSULE ORAL DAILY
Qty: 30 CAPSULE | Refills: 0 | Status: SHIPPED | OUTPATIENT
Start: 2024-11-18

## 2024-11-18 NOTE — TELEPHONE ENCOUNTER
Fluoxetine      Last Written Prescription Date:  10/16/24  Last Fill Quantity: 30,   # refills: 0  Last Office Visit: 6/3/24  Future Office visit:       Routing refill request to provider for review/approval because:

## 2024-11-19 DIAGNOSIS — Z30.8 ENCOUNTER FOR OTHER CONTRACEPTIVE MANAGEMENT: ICD-10-CM

## 2024-11-19 RX ORDER — DESOGESTREL AND ETHINYL ESTRADIOL 0.15-0.03
1 KIT ORAL DAILY
Qty: 84 TABLET | Refills: 1 | Status: SHIPPED | OUTPATIENT
Start: 2024-11-19

## 2024-11-19 NOTE — TELEPHONE ENCOUNTER
Is[place]      Last Written Prescription Date:  8/12/24  Last Fill Quantity: 84,   # refills: 0  Last Office Visit: 7/5/24  Future Office visit:       Routing refill request to provider for review/approval because:

## 2025-01-08 ENCOUNTER — TELEPHONE (OUTPATIENT)
Dept: PEDIATRICS | Facility: OTHER | Age: 25
End: 2025-01-08

## 2025-01-08 NOTE — TELEPHONE ENCOUNTER
9:11 AM    Reason for Call: Phone Call    Description: Patient is calling wanting to know if she can have her appointment on 01/13/25 done through zoom. Patient states she does not have a car to get to the appointment.    Was an appointment offered for this call? No  If yes : Appointment type              Date    Preferred method for responding to this message: Telephone Call  What is your phone number ?  [phone]    If we cannot reach you directly, may we leave a detailed response at the number you provided? Yes    Can this message wait until your PCP/provider returns, if available today? YES, [facility]

## 2025-01-10 NOTE — PROGRESS NOTES
Assessment & Plan     Other depression  Agreeable to mental health referral for medication management. Previously had been seeing Samreen Angeles, who has left the area. Referral placed for KENDRICK Garibay, CNP.  List of local providers given, as Marimar currently does not have regular transportation and may find another location more convenient.     Reasonable to trial an increase in fluoxetine to 20 mg daily, with close follow up in about 3 weeks.     - Adult Mental Health  Referral; Future  - multivitamin w/minerals (THERA-VIT-M) tablet; Take 1 tablet by mouth daily.  - FLUoxetine (PROZAC) 20 MG capsule; Take 1 capsule (20 mg) by mouth daily.    Anxiety    - TSH with free T4 reflex  - Adult Mental Health  Referral; Future  - multivitamin w/minerals (THERA-VIT-M) tablet; Take 1 tablet by mouth daily.  - FLUoxetine (PROZAC) 20 MG capsule; Take 1 capsule (20 mg) by mouth daily.  - T4 free    Low serum ferritin level  Ferritin today is normal, okay to stop the iron supplement.  - ferrous sulfate (FEROSUL) 325 (65 Fe) MG tablet; Take 1 tablet (325 mg) by mouth every other day.  - CBC with Platelets & Differential  - Ferritin    Suicidal ideation  Marimar endorses a safety plan, denies a desire to act on SI. She has access to support, and endorses knowledge of when to contact the crisis line (24/7 availability)  - Adult Mental Health  Referral; Future    Sleep walking and eating  Recommend trying to avoid cannabis at bedtime to see if sleep walking/eating resolves. If it continues, will consider referral for a sleep study.          BMI  Estimated body mass index is 32.87 kg/m  as calculated from the following:    Height as of 7/5/24: 1.524 m (5').    Weight as of this encounter: 76.3 kg (168 lb 4.8 oz).       Depression Screening Follow Up        1/13/2025     3:09 PM   PHQ   PHQ-9 Total Score 12    Q9: Thoughts of better off dead/self-harm past 2 weeks Several days   F/U: Thoughts of  suicide or self-harm Yes   F/U: Self harm-plan Yes   F/U: Self-harm action No   F/U: Safety concerns No       Patient-reported                 1/13/2025     3:38 PM   C-SSRS (Brief [place])   Within the last month, have you wished you were dead or wished you could go to sleep and not wake up? Yes   Within the last month, have you had any actual thoughts of killing yourself? Yes   Within the last month, have you been thinking about how you might do this? Yes   Within the last month, have you had these thoughts and had some intention of acting on them? No   Within the last month, have you started to work out or worked out the details of how to kill yourself with the intent to carry out this plan? No   Within the last month, have you ever done anything, started to do anything, or prepared to do anything to end your life? Yes, lifetime              Follow Up Actions Taken  Crisis resource information provided in the After Visit Summary  Mental Health Referral placed     Discussed the following ways the patient can remain in a safe environment:  remove alcohol, remove drugs, remove things I could use to hurt myself: such as ropes, and be around others    20 minutes spent on the date of the encounter doing chart review, history and exam, documentation and further activities per the note    The longitudinal plan of care for the diagnosis(es)/condition(s) as documented were addressed during this visit. Due to the added complexity in care, I will continue to support Marimar in the subsequent management and with ongoing continuity of care.    Return in about 3 weeks (around 2/3/2025) for Medication follow up.    Lisette Minaya is a 25 year old, presenting for the following health issues:  Depression        1/13/2025     3:12 PM   Additional Questions   Roomed by Mary MAYS   Accompanied by self     History of Present Illness       Reason for visit:  Meds   She is taking medications regularly.         Depression and  Anxiety   How are you doing with your depression since your last visit? Improved a little   How are you doing with your anxiety since your last visit?  Improved slowly   Are you having other symptoms that might be associated with depression or anxiety? Yes:  suicidal thoughts but not as much as before , hard time sleeping, nightmares, anxious   Have you had a significant life event? OTHER: mom sick right now in ICU.     Do you have any concerns with your use of alcohol or other drugs? No  Thinks it would be helpful to increase fluoxetine, as she has been having increased anxiety and thoughts. She stopped the Vyvanse and Adderall quite a while ago, which was helpful, but thinks an increase may be helpful. She has been having increased paranoid thoughts at night. She takes the olanzapine and trazodone as needed at bedtime. They had been helpful previously.  Previous mental health medication management with Samreen Angeles, who moved out of the area.   Concerned she has been gaining weight. She has been sleepwalking and sleep eating. Seems to be happening every night. She takes medical cannabis daily, during the day and at bedtime.    Social History     Tobacco Use    Smoking status: Never    Smokeless tobacco: Never   Vaping Use    Vaping status: Never Used   Substance Use Topics    Alcohol use: Yes     Comment: drinks once or twice per month    Drug use: Yes     Types: Marijuana     Comment: Medical cannabis daily         6/15/2022     8:00 AM 8/25/2022     7:00 AM 1/13/2025     3:09 PM   PHQ   PHQ-9 Total Score 24 24 12    Q9: Thoughts of better off dead/self-harm past 2 weeks Not at all Several days Several days   F/U: Thoughts of suicide or self-harm   Yes   F/U: Self harm-plan   Yes   F/U: Self-harm action   No   F/U: Safety concerns   No       Patient-reported         8/25/2022     7:00 AM 6/3/2024     1:56 PM 1/13/2025     3:09 PM   ANAYA-7 SCORE   Total Score  7 (mild anxiety) 8 (mild anxiety)   Total Score 21 7 8         Patient-reported         1/13/2025     3:09 PM   Last PHQ-9   1.  Little interest or pleasure in doing things 1   2.  Feeling down, depressed, or hopeless 1   3.  Trouble falling or staying asleep, or sleeping too much 3   4.  Feeling tired or having little energy 1   5.  Poor appetite or overeating 3   6.  Feeling bad about yourself 1   7.  Trouble concentrating 0   8.  Moving slowly or restless 1   Q9: Thoughts of better off dead/self-harm past 2 weeks 1   PHQ-9 Total Score 12    In the past two weeks have you had thoughts of suicide or self harm? Yes   Do you have concerns about your personal safety or the safety of others? No   In the past 2 weeks have you thought about a plan or had intention to harm yourself? Yes   In the past 2 weeks have you acted on these thoughts in any way? No       Patient-reported         1/13/2025     3:09 PM   ANAYA-7    1. Feeling nervous, anxious, or on edge 1   2. Not being able to stop or control worrying 1   3. Worrying too much about different things 1   4. Trouble relaxing 1   5. Being so restless that it is hard to sit still 0   6. Becoming easily annoyed or irritable 1   7. Feeling afraid, as if something awful might happen 3   ANAYA-7 Total Score 8    If you checked any problems, how difficult have they made it for you to do your work, take care of things at home, or get along with other people? Not difficult at all       Patient-reported            1/13/2025     3:38 PM   C-SSRS (Brief [place])   Within the last month, have you wished you were dead or wished you could go to sleep and not wake up? Yes   Within the last month, have you had any actual thoughts of killing yourself? Yes   Within the last month, have you been thinking about how you might do this? Yes   Within the last month, have you had these thoughts and had some intention of acting on them? No   Within the last month, have you started to work out or worked out the details of how to kill yourself with the  intent to carry out this plan? No   Within the last month, have you ever done anything, started to do anything, or prepared to do anything to end your life? Yes, lifetime       Follow Up Actions Taken  Crisis resource information provided in the After Visit Summary  Mental Health Referral placed    Discussed the following ways the patient can remain in a safe environment:  remove alcohol, remove drugs, remove things I could use to hurt myself: such as ropes, and be around others  Suicide Assessment Five-step Evaluation and Treatment (SAFE-T)          Review of Systems  Constitutional, HEENT, cardiovascular, pulmonary, gi and gu systems are negative, except as otherwise noted.      Objective    /80 (BP Location: Right arm, Patient Position: Chair, Cuff Size: Adult Regular)   Pulse 98   Temp 98.6  F (37  C) (Tympanic)   Resp 16   Wt 76.3 kg (168 lb 4.8 oz)   LMP  (LMP Unknown)   SpO2 98%   BMI 32.87 kg/m    Body mass index is 32.87 kg/m .  Physical Exam   Mental Status Assessment:  Appearance:   Appropriate   Eye Contact:   Good   Psychomotor Behavior: Normal   Attitude:   Cooperative   Orientation:   All  Speech   Rate / Production: Normal    Volume:  Normal   Mood:    Normal  Affect:    Appropriate   Thought Content:  Clear   Thought Form:  Coherent  Logical   Insight:    Good       Safety Issues and Plan for Safety and Risk Management:  Client denies current fears or concerns for personal safety.  Client reports the following current or recent suicidal ideation or behaviors: Intermittent suicidal ideation, relatively fleeting thoughts, states she recognizes those thoughts as fleeting, denies desire to act on them..  Client denies current or recent homicidal ideation or behaviors.  Client denies current or recent self injurious behavior or ideation.  Client denies other safety concerns.  Recommended that patient call 911 or go to the local ED should there be a change in any of these risk factors  Client  reports there are no firearms in the house.      Results for orders placed or performed in visit on 01/13/25   Ferritin     Status: Normal   Result Value Ref Range    Ferritin 129 6 - 175 ng/mL   TSH with free T4 reflex     Status: Abnormal   Result Value Ref Range    TSH 4.40 (H) 0.30 - 4.20 uIU/mL   CBC with platelets and differential     Status: None   Result Value Ref Range    WBC Count 8.5 4.0 - 11.0 10e3/uL    RBC Count 4.49 3.80 - 5.20 10e6/uL    Hemoglobin 13.8 11.7 - 15.7 g/dL    Hematocrit 40.5 35.0 - 47.0 %    MCV 90 78 - 100 fL    MCH 30.7 26.5 - 33.0 pg    MCHC 34.1 31.5 - 36.5 g/dL    RDW 12.2 10.0 - 15.0 %    Platelet Count 361 150 - 450 10e3/uL    % Neutrophils 54 %    % Lymphocytes 35 %    % Monocytes 8 %    % Eosinophils 2 %    % Basophils 1 %    % Immature Granulocytes 0 %    NRBCs per 100 WBC 0 <1 /100    Absolute Neutrophils 4.6 1.6 - 8.3 10e3/uL    Absolute Lymphocytes 3.0 0.8 - 5.3 10e3/uL    Absolute Monocytes 0.7 0.0 - 1.3 10e3/uL    Absolute Eosinophils 0.2 0.0 - 0.7 10e3/uL    Absolute Basophils 0.1 0.0 - 0.2 10e3/uL    Absolute Immature Granulocytes 0.0 <=0.4 10e3/uL    Absolute NRBCs 0.0 10e3/uL   T4 free     Status: Normal   Result Value Ref Range    Free T4 1.04 0.90 - 1.70 ng/dL   CBC with Platelets & Differential     Status: None    Narrative    The following orders were created for panel order CBC with Platelets & Differential.  Procedure                               Abnormality         Status                     ---------                               -----------         ------                     CBC with platelets and d...[[id number]]                      Final result                 Please view results for these tests on the individual orders.             Signed Electronically by: KENDRICK Oakes CNP

## 2025-01-13 ENCOUNTER — OFFICE VISIT (OUTPATIENT)
Dept: PEDIATRICS | Facility: OTHER | Age: 25
End: 2025-01-13
Attending: NURSE PRACTITIONER
Payer: COMMERCIAL

## 2025-01-13 ENCOUNTER — TELEPHONE (OUTPATIENT)
Dept: PEDIATRICS | Facility: OTHER | Age: 25
End: 2025-01-13

## 2025-01-13 VITALS
WEIGHT: 168.3 LBS | RESPIRATION RATE: 16 BRPM | TEMPERATURE: 98.6 F | SYSTOLIC BLOOD PRESSURE: 126 MMHG | DIASTOLIC BLOOD PRESSURE: 80 MMHG | OXYGEN SATURATION: 98 % | BODY MASS INDEX: 32.87 KG/M2 | HEART RATE: 98 BPM

## 2025-01-13 DIAGNOSIS — F41.9 ANXIETY: ICD-10-CM

## 2025-01-13 DIAGNOSIS — R79.0 LOW SERUM FERRITIN LEVEL: ICD-10-CM

## 2025-01-13 DIAGNOSIS — R45.851 SUICIDAL IDEATION: ICD-10-CM

## 2025-01-13 DIAGNOSIS — F32.89 OTHER DEPRESSION: Primary | ICD-10-CM

## 2025-01-13 DIAGNOSIS — F51.3 SLEEP WALKING AND EATING: ICD-10-CM

## 2025-01-13 LAB
BASOPHILS # BLD AUTO: 0.1 10E3/UL (ref 0–0.2)
BASOPHILS NFR BLD AUTO: 1 %
EOSINOPHIL # BLD AUTO: 0.2 10E3/UL (ref 0–0.7)
EOSINOPHIL NFR BLD AUTO: 2 %
ERYTHROCYTE [DISTWIDTH] IN BLOOD BY AUTOMATED COUNT: 12.2 % (ref 10–15)
FERRITIN SERPL-MCNC: 129 NG/ML (ref 6–175)
HCT VFR BLD AUTO: 40.5 % (ref 35–47)
HGB BLD-MCNC: 13.8 G/DL (ref 11.7–15.7)
IMM GRANULOCYTES # BLD: 0 10E3/UL
IMM GRANULOCYTES NFR BLD: 0 %
LYMPHOCYTES # BLD AUTO: 3 10E3/UL (ref 0.8–5.3)
LYMPHOCYTES NFR BLD AUTO: 35 %
MCH RBC QN AUTO: 30.7 PG (ref 26.5–33)
MCHC RBC AUTO-ENTMCNC: 34.1 G/DL (ref 31.5–36.5)
MCV RBC AUTO: 90 FL (ref 78–100)
MONOCYTES # BLD AUTO: 0.7 10E3/UL (ref 0–1.3)
MONOCYTES NFR BLD AUTO: 8 %
NEUTROPHILS # BLD AUTO: 4.6 10E3/UL (ref 1.6–8.3)
NEUTROPHILS NFR BLD AUTO: 54 %
NRBC # BLD AUTO: 0 10E3/UL
NRBC BLD AUTO-RTO: 0 /100
PLATELET # BLD AUTO: 361 10E3/UL (ref 150–450)
RBC # BLD AUTO: 4.49 10E6/UL (ref 3.8–5.2)
T4 FREE SERPL-MCNC: 1.04 NG/DL (ref 0.9–1.7)
TSH SERPL DL<=0.005 MIU/L-ACNC: 4.4 UIU/ML (ref 0.3–4.2)
WBC # BLD AUTO: 8.5 10E3/UL (ref 4–11)

## 2025-01-13 PROCEDURE — 82728 ASSAY OF FERRITIN: CPT | Mod: ZL | Performed by: NURSE PRACTITIONER

## 2025-01-13 PROCEDURE — 85018 HEMOGLOBIN: CPT | Mod: ZL | Performed by: NURSE PRACTITIONER

## 2025-01-13 PROCEDURE — G0463 HOSPITAL OUTPT CLINIC VISIT: HCPCS

## 2025-01-13 PROCEDURE — 84443 ASSAY THYROID STIM HORMONE: CPT | Mod: ZL | Performed by: NURSE PRACTITIONER

## 2025-01-13 PROCEDURE — 85004 AUTOMATED DIFF WBC COUNT: CPT | Mod: ZL | Performed by: NURSE PRACTITIONER

## 2025-01-13 PROCEDURE — 36415 COLL VENOUS BLD VENIPUNCTURE: CPT | Mod: ZL | Performed by: NURSE PRACTITIONER

## 2025-01-13 PROCEDURE — 84439 ASSAY OF FREE THYROXINE: CPT | Mod: ZL | Performed by: NURSE PRACTITIONER

## 2025-01-13 RX ORDER — FERROUS SULFATE 325(65) MG
325 TABLET ORAL EVERY OTHER DAY
Qty: 30 TABLET | Refills: 0 | Status: SHIPPED | OUTPATIENT
Start: 2025-01-13

## 2025-01-13 RX ORDER — MULTIPLE VITAMINS W/ MINERALS TAB 9MG-400MCG
1 TAB ORAL DAILY
Qty: 30 TABLET | Refills: 3 | Status: SHIPPED | OUTPATIENT
Start: 2025-01-13

## 2025-01-13 ASSESSMENT — PATIENT HEALTH QUESTIONNAIRE - PHQ9
SUM OF ALL RESPONSES TO PHQ QUESTIONS 1-9: 12
10. IF YOU CHECKED OFF ANY PROBLEMS, HOW DIFFICULT HAVE THESE PROBLEMS MADE IT FOR YOU TO DO YOUR WORK, TAKE CARE OF THINGS AT HOME, OR GET ALONG WITH OTHER PEOPLE: NOT DIFFICULT AT ALL
SUM OF ALL RESPONSES TO PHQ QUESTIONS 1-9: 12

## 2025-01-13 ASSESSMENT — ANXIETY QUESTIONNAIRES
IF YOU CHECKED OFF ANY PROBLEMS ON THIS QUESTIONNAIRE, HOW DIFFICULT HAVE THESE PROBLEMS MADE IT FOR YOU TO DO YOUR WORK, TAKE CARE OF THINGS AT HOME, OR GET ALONG WITH OTHER PEOPLE: NOT DIFFICULT AT ALL
7. FEELING AFRAID AS IF SOMETHING AWFUL MIGHT HAPPEN: NEARLY EVERY DAY
6. BECOMING EASILY ANNOYED OR IRRITABLE: SEVERAL DAYS
2. NOT BEING ABLE TO STOP OR CONTROL WORRYING: SEVERAL DAYS
3. WORRYING TOO MUCH ABOUT DIFFERENT THINGS: SEVERAL DAYS
GAD7 TOTAL SCORE: 8
4. TROUBLE RELAXING: SEVERAL DAYS
5. BEING SO RESTLESS THAT IT IS HARD TO SIT STILL: NOT AT ALL
7. FEELING AFRAID AS IF SOMETHING AWFUL MIGHT HAPPEN: NEARLY EVERY DAY
8. IF YOU CHECKED OFF ANY PROBLEMS, HOW DIFFICULT HAVE THESE MADE IT FOR YOU TO DO YOUR WORK, TAKE CARE OF THINGS AT HOME, OR GET ALONG WITH OTHER PEOPLE?: NOT DIFFICULT AT ALL
GAD7 TOTAL SCORE: 8
1. FEELING NERVOUS, ANXIOUS, OR ON EDGE: SEVERAL DAYS
GAD7 TOTAL SCORE: 8

## 2025-01-13 ASSESSMENT — COLUMBIA-SUICIDE SEVERITY RATING SCALE - C-SSRS
3. IN THE PAST MONTH, HAVE YOU BEEN THINKING ABOUT HOW YOU MIGHT KILL YOURSELF?: YES
5. IN THE PAST MONTH, HAVE YOU STARTED TO WORK OUT OR WORKED OUT THE DETAILS OF HOW TO KILL YOURSELF? DO YOU INTEND TO CARRY OUT THIS PLAN?: NO
4. IN THE PAST MONTH, HAVE YOU HAD THESE THOUGHTS AND HAD SOME INTENTION OF ACTING ON THEM?: NO
1. WITHIN THE PAST MONTH, HAVE YOU WISHED YOU WERE DEAD OR WISHED YOU COULD GO TO SLEEP AND NOT WAKE UP?: YES
6. HAVE YOU EVER DONE ANYTHING, STARTED TO DO ANYTHING, OR PREPARED TO DO ANYTHING TO END YOUR LIFE?: YES, LIFETIME
2. IN THE PAST MONTH, HAVE YOU ACTUALLY HAD ANY THOUGHTS OF KILLING YOURSELF?: YES

## 2025-01-13 ASSESSMENT — PAIN SCALES - GENERAL: PAINLEVEL_OUTOF10: NO PAIN (0)

## 2025-01-13 NOTE — TELEPHONE ENCOUNTER
9:23 AM    Reason for Call: Phone Call, RE: Today's Appointment    Description: Pt called, states that she could do an in-person visit for today's (1/13/25 @ 3:30PM)  appt. if KENDRICK Neal CNP would prefer in-person. Pt states she is currently visiting someone in the ICU at [facility].         Preferred method for responding to this message: Telephone Call  What is your phone number ? [phone]    If we cannot reach you directly, may we leave a detailed response at the number you provided? Yes    Can this message wait until your PCP/provider returns, if unavailable today? Carolyn Armstrong

## 2025-01-13 NOTE — TELEPHONE ENCOUNTER
Attempt # 1  Outcome: Left Message   Comment: LVM for pt that it would be changed to an in person visit

## 2025-01-13 NOTE — PATIENT INSTRUCTIONS
Community Resources:    - Crisis Text Line: text or call 981  -Suicide LifeLine Chat: suicidepreventionlifeline.org/chat  -National [place] on Mental Illness (www.ashley.org): 952.288.8146 or 747-697-7130.   -Mental Health Association (www.mentalhealth.org): 181.918.4053 or 179-665-5207        Psychologists/ Counselors                        [facility]          ...            ..[phone]  [facility]                    ..  [phone]  [facility]                 .[phone]  [facility] (kids)       .         [phone]  [facility](teens)                ..[phone]  [facility]                    [phone]  [facility]                   [phone]  [facility]           ...      .. [phone]  [facility]                ...[phone]  [facility]     ...          ...[phone]  [facility]               [phone]   [facility]..            .[phone]  [facility]               .    [phone]  [facility]                ..[phone]  [facility]     .      . ..[phone]  [facility].....................................................................................[phone]  Critical access hospital.........................................................................[phone]  [facility].......................................................[phone]     [facility]              .   .[phone]  [facility]                   .[phone]              [facility]              .   [phone]  [facility]           ...      .. [phone]  [facility]              . ..[phone]  [facility]              . ..  ..[phone]  [facility]              .     .. [phone]  [facility]                 ... [phone]  [facility]                         .[phone]  [facility]     .      . ..[phone]  [facility]...............................................................[phone]  [facility].....................................................................................[phone]  [facility]................................................................................[phone]  [facility]..................................................................[phone]  [facility]........................................................[phone]  [facility]..................................................................[phone]  [facility]............................................[phone]     [facility]............................................................................[phone]     [facility]............................................................................[phone]            [facility]              ....  [phone]                 [facility]                                                                   [phone]  [facility]             . ... ..[phone]  [facility]              .     ..[phone]  [facility]        .      . [phone]  [facility]           .[phone]  [facility]         .    ..[phone]  [facility]             . ..  ..  [phone]  [facility]            [phone]  [facility]              ..[phone]  [facility]                     .[phone]  [facility]........................................................................[phone]  [facility]  [facility]sel[facility]...........................................................[phone]  [facility]...................................................................[phone]  [facility].......................................[phone]  [facility]......................................................................[phone]  [facility]....................................................................................[phone]  [facility]............................................[phone]  [facility]..........................................[phone]  [facility]............................................................................[phone]       [facility]    ...     ...[phone]  [facility]...........................................[phone]     [place]  [facility]...........................................................................[phone]  [facility].......................................................[phone]     [facility]            [phone]                                                  [facility]                ..  .  [phone]  [facility]         .     .  [phone]  [facility]              . [phone]  [facility]  ..        [phone]  [facility]              .  ..  [phone]    [facility]           ..   ...  ... [phone]  [facility]             . [phone]  [facility]         ...  [phone]  [facility]     .      . ..[phone]               [facility]....................................................[phone]      [facility] Psychiatry (Specializing in LGBTQIA+  care)................[phone]  [facility]..................................................................[phone]        *Facilities in bold italics indicate medication management  Services are offered.    *Many places offer telehealth/ virtual services, some may need initial intake  Appointment done in person before telehealth can be established.     Crisis support    If you or someone you know is struggling or in mental health crisis, help is available.  Call or text 544 or chat [employer]    The volunteer Crisis Counselor will help you move from a 'hot moment to a cool moment'     FOR HOMELESSNESS OR HOUSING CRISIS CALL 211  **For LOCAL homelessness, food, and other assistance, you can contact:    [facility] in [place]: [phone]  [facility]: 760.590.5055  [facility]: [phone] / [phone]  Call 211 for homelessness assistance in the [facility] shelter: 572-424-0082  Housing crisis center: [phone] / [phone] ext [phone]  [facility]: [phone]  [facility]: [phone]  [facility]: [phone] / [phone]  [facility]: [phone]

## 2025-02-17 DIAGNOSIS — F32.89 OTHER DEPRESSION: ICD-10-CM

## 2025-02-17 DIAGNOSIS — F41.9 ANXIETY: ICD-10-CM

## 2025-02-17 RX ORDER — MULTIPLE VITAMINS W/ MINERALS TAB 9MG-400MCG
1 TAB ORAL DAILY
Qty: 30 TABLET | Refills: 0 | OUTPATIENT
Start: 2025-02-17

## 2025-02-17 NOTE — TELEPHONE ENCOUNTER
Prozac 20      Last Written Prescription Date:  1/13/2025  Last Fill Quantity: 30,   # refills: 0  Last Office Visit: 1/13/2025  Future Office visit:       Routing refill request to provider for review/approval because:   PHQ-9 score less than 5 in past 6 months    ANAYA-7 score of less than 5 in past 6 months.

## 2025-03-06 ENCOUNTER — OFFICE VISIT (OUTPATIENT)
Dept: PSYCHIATRY | Facility: OTHER | Age: 25
End: 2025-03-06
Payer: COMMERCIAL

## 2025-03-06 VITALS
RESPIRATION RATE: 16 BRPM | SYSTOLIC BLOOD PRESSURE: 128 MMHG | HEART RATE: 78 BPM | BODY MASS INDEX: 33.77 KG/M2 | WEIGHT: 172 LBS | TEMPERATURE: 97.2 F | HEIGHT: 60 IN | DIASTOLIC BLOOD PRESSURE: 80 MMHG | OXYGEN SATURATION: 98 %

## 2025-03-06 DIAGNOSIS — F33.3 SEVERE RECURRENT MAJOR DEPRESSIVE DISORDER WITH PSYCHOTIC FEATURES (H): Primary | ICD-10-CM

## 2025-03-06 DIAGNOSIS — F51.3 SLEEP WALKING AND EATING: ICD-10-CM

## 2025-03-06 DIAGNOSIS — F41.0 GENERALIZED ANXIETY DISORDER WITH PANIC ATTACKS: ICD-10-CM

## 2025-03-06 DIAGNOSIS — F41.1 GENERALIZED ANXIETY DISORDER WITH PANIC ATTACKS: ICD-10-CM

## 2025-03-06 DIAGNOSIS — R45.851 SUICIDAL IDEATION: ICD-10-CM

## 2025-03-06 DIAGNOSIS — F41.9 ANXIETY: ICD-10-CM

## 2025-03-06 DIAGNOSIS — R79.89 TSH ELEVATION: ICD-10-CM

## 2025-03-06 LAB
T4 FREE SERPL-MCNC: 1.2 NG/DL (ref 0.9–1.7)
TSH SERPL DL<=0.005 MIU/L-ACNC: 6.58 UIU/ML (ref 0.3–4.2)

## 2025-03-06 PROCEDURE — 82607 VITAMIN B-12: CPT | Mod: ZL

## 2025-03-06 PROCEDURE — 36415 COLL VENOUS BLD VENIPUNCTURE: CPT | Mod: ZL

## 2025-03-06 PROCEDURE — 84439 ASSAY OF FREE THYROXINE: CPT | Mod: ZL

## 2025-03-06 PROCEDURE — 99205 OFFICE O/P NEW HI 60 MIN: CPT

## 2025-03-06 PROCEDURE — 1126F AMNT PAIN NOTED NONE PRSNT: CPT

## 2025-03-06 PROCEDURE — G0463 HOSPITAL OUTPT CLINIC VISIT: HCPCS

## 2025-03-06 PROCEDURE — 99417 PROLNG OP E/M EACH 15 MIN: CPT

## 2025-03-06 PROCEDURE — 82306 VITAMIN D 25 HYDROXY: CPT | Mod: ZL

## 2025-03-06 PROCEDURE — 3074F SYST BP LT 130 MM HG: CPT

## 2025-03-06 PROCEDURE — 84443 ASSAY THYROID STIM HORMONE: CPT | Mod: ZL

## 2025-03-06 PROCEDURE — 3079F DIAST BP 80-89 MM HG: CPT

## 2025-03-06 RX ORDER — ARIPIPRAZOLE 2 MG/1
2 TABLET ORAL DAILY
Qty: 30 TABLET | Refills: 1 | Status: SHIPPED | OUTPATIENT
Start: 2025-03-06

## 2025-03-06 RX ORDER — FLUOXETINE HYDROCHLORIDE 40 MG/1
40 CAPSULE ORAL DAILY
Qty: 30 CAPSULE | Refills: 2 | Status: SHIPPED | OUTPATIENT
Start: 2025-03-06

## 2025-03-06 RX ORDER — HYDROXYZINE HYDROCHLORIDE 25 MG/1
25-50 TABLET, FILM COATED ORAL 3 TIMES DAILY PRN
Qty: 90 TABLET | Refills: 2 | Status: SHIPPED | OUTPATIENT
Start: 2025-03-06

## 2025-03-06 ASSESSMENT — ANXIETY QUESTIONNAIRES
GAD7 TOTAL SCORE: 11
GAD7 TOTAL SCORE: 11
8. IF YOU CHECKED OFF ANY PROBLEMS, HOW DIFFICULT HAVE THESE MADE IT FOR YOU TO DO YOUR WORK, TAKE CARE OF THINGS AT HOME, OR GET ALONG WITH OTHER PEOPLE?: NOT DIFFICULT AT ALL
4. TROUBLE RELAXING: SEVERAL DAYS
IF YOU CHECKED OFF ANY PROBLEMS ON THIS QUESTIONNAIRE, HOW DIFFICULT HAVE THESE PROBLEMS MADE IT FOR YOU TO DO YOUR WORK, TAKE CARE OF THINGS AT HOME, OR GET ALONG WITH OTHER PEOPLE: NOT DIFFICULT AT ALL
7. FEELING AFRAID AS IF SOMETHING AWFUL MIGHT HAPPEN: MORE THAN HALF THE DAYS
3. WORRYING TOO MUCH ABOUT DIFFERENT THINGS: MORE THAN HALF THE DAYS
7. FEELING AFRAID AS IF SOMETHING AWFUL MIGHT HAPPEN: MORE THAN HALF THE DAYS
1. FEELING NERVOUS, ANXIOUS, OR ON EDGE: MORE THAN HALF THE DAYS
2. NOT BEING ABLE TO STOP OR CONTROL WORRYING: MORE THAN HALF THE DAYS
6. BECOMING EASILY ANNOYED OR IRRITABLE: SEVERAL DAYS
GAD7 TOTAL SCORE: 11
5. BEING SO RESTLESS THAT IT IS HARD TO SIT STILL: SEVERAL DAYS

## 2025-03-06 ASSESSMENT — COLUMBIA-SUICIDE SEVERITY RATING SCALE - C-SSRS
6. HAVE YOU EVER DONE ANYTHING, STARTED TO DO ANYTHING, OR PREPARED TO DO ANYTHING TO END YOUR LIFE?: NO
4. HAVE YOU HAD THESE THOUGHTS AND HAD SOME INTENTION OF ACTING ON THEM?: NO
5. IN THE PAST MONTH, HAVE YOU STARTED TO WORK OUT OR WORKED OUT THE DETAILS OF HOW TO KILL YOURSELF? DO YOU INTEND TO CARRY OUT THIS PLAN?: NO
3. IN THE PAST MONTH, HAVE YOU BEEN THINKING ABOUT HOW YOU MIGHT KILL YOURSELF?: NO
5. IN THE PAST MONTH, HAVE YOU STARTED TO WORK OUT OR WORKED OUT THE DETAILS OF HOW TO KILL YOURSELF? DO YOU INTEND TO CARRY OUT THIS PLAN?: NO
2. IN THE PAST MONTH, HAVE YOU ACTUALLY HAD ANY THOUGHTS OF KILLING YOURSELF?: YES
2. HAVE YOU ACTUALLY HAD ANY THOUGHTS OF KILLING YOURSELF?: YES
6. IN YOUR LIFETIME, HAVE YOU EVER DONE ANYTHING, STARTED TO DO ANYTHING, OR PREPARED TO DO ANYTHING TO END YOUR LIFE?: YES
1. IN THE PAST MONTH, HAVE YOU WISHED YOU WERE DEAD OR WISHED YOU COULD GO TO SLEEP AND NOT WAKE UP?: YES
1. WITHIN THE PAST MONTH, HAVE YOU WISHED YOU WERE DEAD OR WISHED YOU COULD GO TO SLEEP AND NOT WAKE UP?: YES
3. HAVE YOU BEEN THINKING ABOUT HOW YOU MIGHT KILL YOURSELF?: YES
5. HAVE YOU STARTED TO WORK OUT OR WORKED OUT THE DETAILS OF HOW TO KILL YOURSELF? DO YOU INTEND TO CARRY OUT THIS PLAN?: NO

## 2025-03-06 ASSESSMENT — PATIENT HEALTH QUESTIONNAIRE - PHQ9
SUM OF ALL RESPONSES TO PHQ QUESTIONS 1-9: 13
10. IF YOU CHECKED OFF ANY PROBLEMS, HOW DIFFICULT HAVE THESE PROBLEMS MADE IT FOR YOU TO DO YOUR WORK, TAKE CARE OF THINGS AT HOME, OR GET ALONG WITH OTHER PEOPLE: NOT DIFFICULT AT ALL
SUM OF ALL RESPONSES TO PHQ QUESTIONS 1-9: 13

## 2025-03-06 ASSESSMENT — PAIN SCALES - GENERAL: PAINLEVEL_OUTOF10: NO PAIN (0)
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
## 2025-03-06 NOTE — PATIENT INSTRUCTIONS
"Thank you for allowing KENDRICK Garibay, [facility] to participate in your care.  If you have a scheduling or an appointment question please contact our scheduling department at their direct line [phone].   ALL nursing questions or concerns can be directed to the psychiatry nurses at [phone] (Randi) or [phone] (Azul)    [facility] Resources  Crisis Text Line: text or call 988  pgo1fxtl- Text \"Life\" to 10581  Crisis Text Line: Text  [place]  to 906513  First Call for Help: 2-1-1  Suicide LifeLine Chat: suicidepreventionlifeline.org/chat  National Suicide Prevention Lifeline: 3-846-112-TALK (0910)  National [place] on Mental Illness (www.angie.org): 720.937.6165 or 770-930-4084  [place] Helpline- 3-918-BFWU-HELP   Mental Health Association (www.mentalhealth.org): 310.477.8222 or 295-952-5748  [place] Crisis and Referral Services: 1-312.500.9734    "

## 2025-03-07 LAB
VIT B12 SERPL-MCNC: 512 PG/ML (ref 232–1245)
VIT D+METAB SERPL-MCNC: 43 NG/ML (ref 20–50)

## 2025-03-09 PROBLEM — F41.1 GENERALIZED ANXIETY DISORDER WITH PANIC ATTACKS: Status: ACTIVE | Noted: 2025-03-09

## 2025-03-09 PROBLEM — F33.3 SEVERE RECURRENT MAJOR DEPRESSIVE DISORDER WITH PSYCHOTIC FEATURES (H): Status: ACTIVE | Noted: 2025-03-09

## 2025-03-09 PROBLEM — F41.1 GENERALIZED ANXIETY DISORDER WITH PANIC ATTACKS: Status: ACTIVE | Noted: 2021-10-08

## 2025-03-09 PROBLEM — F41.0 GENERALIZED ANXIETY DISORDER WITH PANIC ATTACKS: Status: ACTIVE | Noted: 2025-03-09

## 2025-03-09 PROBLEM — F41.0 GENERALIZED ANXIETY DISORDER WITH PANIC ATTACKS: Status: ACTIVE | Noted: 2021-10-08

## 2025-03-09 PROBLEM — F33.3 SEVERE RECURRENT MAJOR DEPRESSIVE DISORDER WITH PSYCHOTIC FEATURES (H): Status: ACTIVE | Noted: 2022-02-21

## 2025-03-11 ENCOUNTER — MYC MEDICAL ADVICE (OUTPATIENT)
Dept: PULMONOLOGY | Facility: OTHER | Age: 25
End: 2025-03-11

## 2025-03-11 NOTE — Clinical Note
This is another patient where it does not appear they completed the sleep questionnaire, only STOPBANG, HEATH and [place].

## 2025-03-21 ASSESSMENT — SLEEP AND FATIGUE QUESTIONNAIRES
HOW LIKELY ARE YOU TO NOD OFF OR FALL ASLEEP WHILE SITTING INACTIVE IN A PUBLIC PLACE: MODERATE CHANCE OF DOZING
HOW LIKELY ARE YOU TO NOD OFF OR FALL ASLEEP IN A CAR, WHILE STOPPED FOR A FEW MINUTES IN TRAFFIC: WOULD NEVER DOZE
HOW LIKELY ARE YOU TO NOD OFF OR FALL ASLEEP WHILE SITTING AND TALKING TO SOMEONE: WOULD NEVER DOZE
HOW LIKELY ARE YOU TO NOD OFF OR FALL ASLEEP WHILE SITTING AND READING: SLIGHT CHANCE OF DOZING
HOW LIKELY ARE YOU TO NOD OFF OR FALL ASLEEP WHILE LYING DOWN TO REST IN THE AFTERNOON WHEN CIRCUMSTANCES PERMIT: SLIGHT CHANCE OF DOZING
HOW LIKELY ARE YOU TO NOD OFF OR FALL ASLEEP WHILE WATCHING TV: SLIGHT CHANCE OF DOZING
HOW LIKELY ARE YOU TO NOD OFF OR FALL ASLEEP WHEN YOU ARE A PASSENGER IN A CAR FOR AN HOUR WITHOUT A BREAK: SLIGHT CHANCE OF DOZING
HOW LIKELY ARE YOU TO NOD OFF OR FALL ASLEEP WHILE SITTING QUIETLY AFTER LUNCH WITHOUT ALCOHOL: MODERATE CHANCE OF DOZING

## 2025-03-21 NOTE — PROGRESS NOTES
03/21/25 1210   P[place] Sleepiness Scale   Sitting and reading (Patient-Rptd)  1   Watching TV (Patient-Rptd)  1   Sitting, inactive in a public place (e.g. a theatre or a meeting) (Patient-Rptd)  2   As a passenger in a car for an hour without a break (Patient-Rptd)  1   Lying down to rest in the afternoon when circumstances permit (Patient-Rptd)  1   Sitting and talking to someone (Patient-Rptd)  0   Sitting quietly after a lunch without alcohol (Patient-Rptd)  2   In a car, while stopped for a few minutes in traffic (Patient-Rptd)  0   Total score - [place] (Patient-Rptd)  8           3/21/2025    12:10 PM    Pep[place]rell Sleepiness Scale ( [place] [place]  [id number]<br>ESS - [place]/English - Final version - 21 Nov 07 - [facility] [place].)   Sitting and reading Slight chance of dozing   Watching TV Slight chance of dozing   Sitting, inactive in a public place (e.g. a theatre or a meeting) Moderate chance of dozing   As a passenger in a car for an hour without a break Slight chance of dozing   Lying down to rest in the afternoon when circumstances permit Slight chance of dozing   Sitting and talking to someone Would never doze   Sitting quietly after a lunch without alcohol Moderate chance of dozing   In a car, while stopped for a few minutes in traffic Would never doze   P[place]rell Score (MC) 8   P[place]rell Score (Sleep) 8        Patient-reported         3/21/2025    12:09 PM   Insomnia Severity Index (HEATH)   Difficulty falling asleep 2   Difficulty staying asleep 4   Problems waking up too early 2   How SATISFIED/DISSATISFIED are you with your CURRENT sleep pattern? 4   How NOTICEABLE to others do you think your sleep problem is in terms of impairing the quality of your life? 4   How WORRIED/DISTRESSED are you about your current sleep problem? 4   To what extent do you consider your sleep problem to INTERFERE with your daily functioning (e.g. daytime fatigue, mood, ability to function at work/daily chores, concentration,  memory, mood, etc.) CURRENTLY? 4   HEATH Total Score 24        Patient-reported         3/21/2025    12:09 PM   STOP BANG Questionnaire (  2008, the American Society of Anesthesiologists, Inc. [employer]   1. Snoring - Do you snore loudly (louder than talking or loud enough to be heard through closed doors)? No   2. Tired - Do you often feel tired, fatigued, or sleepy during daytime? Yes   3. Observed - Has anyone observed you stop breathing during your sleep? No   4. Blood pressure - Do you have or are you being treated for high blood pressure? No   5. BMI - BMI more than 35 kg/m2? No   6. Age - Age over 50 yr old? No   7. Neck circumference - Neck circumference greater than 40 cm? No   8. Gender - Gender male? No   STOP BANG Score (MC): 1 (Low risk of VALERIA)

## 2025-03-22 NOTE — TELEPHONE ENCOUNTER
Chart review prior to sleep testing.    Patient Summary:  25 year old female who is referred for abnormal nocturnal behaviors.    Patient Active Problem List    Diagnosis Date Noted    Sleep walking and eating 01/13/2025     Priority: Medium    Dog bite of right thigh, subsequent encounter 06/03/2024     Priority: Medium    Suicidal ideation 08/25/2022     Priority: Medium    Severe recurrent major depressive disorder with psychotic features (H) 02/21/2022     Priority: Medium    Generalized anxiety disorder with panic attacks 10/08/2021     Priority: Medium    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     Priority: Medium    Adenotonsillar hypertrophy 08/16/2013     Priority: Medium       Current Outpatient Medications   Medication Sig Dispense Refill    ARIPiprazole (ABILIFY) 2 MG tablet Take 1 tablet (2 mg) by mouth daily. 30 tablet 1    ASHWAGANDHA PO Take by mouth.      desogestrel-ethinyl estradiol (ISIBLOOM) 0.15-30 MG-MCG tablet TAKE 1 TABLET BY MOUTH DAILY 84 tablet 1    FLUoxetine (PROZAC) 40 MG capsule Take 1 capsule (40 mg) by mouth daily. 30 capsule 2    hydrOXYzine HCl (ATARAX) 25 MG tablet Take 1-2 tablets (25-50 mg) by mouth 3 times daily as needed for anxiety. 90 tablet 2    levothyroxine (SYNTHROID/LEVOTHROID) 25 MCG tablet Take 1 tablet (25 mcg) by mouth every morning (before breakfast). 30 tablet 1    medical cannabis (Patient's own supply) Take as prescribed- vape.      multivitamin w/minerals (THERA-VIT-M) tablet Take 1 tablet by mouth daily. 30 tablet 3    valACYclovir (VALTREX) 500 MG tablet TAKE 1 TABLET BY MOUTH DAILY 30 tablet 4     No current facility-administered medications for this visit.       Pertinent PMHx of abnormal nocturnal behaviors, severe MDD, ANAYA, adenotonsillar hypertrophy, obesity (adult class 1).    Reviewed her most recent visit with Gabriela Alexander on 3/6/2025.  Noted here for concerns regarding sleep walking and eating.    STOP-BANG score of 1, with unknown neck  circumference.  [place] score of 8.  HEATH: 24    BMI of Estimated body mass index is 33.59 kg/m  as calculated from the following:    Height as of 3/6/25: 1.524 m (5').    Weight as of 3/6/25: 78 kg (172 lb).     Does not appear that sleep questionnaire was completed.    A/P:    1.)  Chronic insomnia  2.)  Report of abnormal nocturnal behaviors    Recommend clinic visit to review sleep history / pattern in more detail.      ---  This note was written with the assistance of the Dragon voice-dictation technology software. The final document, although reviewed, may contain errors. For corrections, please contact the office.    Declan Lindsey MD    Sleep Medicine  [place], [place]  Main Office: [phone]  [facility] [address], [address]  Schedule visits: [phone]  Main Office: [phone]  Fax: [phone]

## 2025-04-14 ENCOUNTER — LAB (OUTPATIENT)
Dept: LAB | Facility: OTHER | Age: 25
End: 2025-04-14
Payer: COMMERCIAL

## 2025-04-14 ENCOUNTER — TELEPHONE (OUTPATIENT)
Dept: CARE COORDINATION | Facility: OTHER | Age: 25
End: 2025-04-14

## 2025-04-14 ENCOUNTER — OFFICE VISIT (OUTPATIENT)
Dept: PSYCHIATRY | Facility: OTHER | Age: 25
End: 2025-04-14
Payer: COMMERCIAL

## 2025-04-14 VITALS
WEIGHT: 172 LBS | HEART RATE: 110 BPM | SYSTOLIC BLOOD PRESSURE: 122 MMHG | OXYGEN SATURATION: 99 % | DIASTOLIC BLOOD PRESSURE: 70 MMHG | RESPIRATION RATE: 16 BRPM | TEMPERATURE: 97.8 F | HEIGHT: 60 IN | BODY MASS INDEX: 33.77 KG/M2

## 2025-04-14 DIAGNOSIS — F41.1 GENERALIZED ANXIETY DISORDER WITH PANIC ATTACKS: ICD-10-CM

## 2025-04-14 DIAGNOSIS — F41.0 GENERALIZED ANXIETY DISORDER WITH PANIC ATTACKS: ICD-10-CM

## 2025-04-14 DIAGNOSIS — F51.3 SLEEP WALKING AND EATING: ICD-10-CM

## 2025-04-14 DIAGNOSIS — N76.0 ACUTE VAGINITIS: ICD-10-CM

## 2025-04-14 DIAGNOSIS — F33.3 SEVERE RECURRENT MAJOR DEPRESSIVE DISORDER WITH PSYCHOTIC FEATURES (H): ICD-10-CM

## 2025-04-14 DIAGNOSIS — N76.0 ACUTE VAGINITIS: Primary | ICD-10-CM

## 2025-04-14 DIAGNOSIS — R45.851 SUICIDAL IDEATION: ICD-10-CM

## 2025-04-14 PROCEDURE — G0463 HOSPITAL OUTPT CLINIC VISIT: HCPCS

## 2025-04-14 PROCEDURE — 81515 NFCT DS BV&VAGINITIS DNA ALG: CPT | Mod: ZL

## 2025-04-14 ASSESSMENT — ABNORMAL INVOLUNTARY MOVEMENT SCALE (AIMS)
TONGUE: 0
CURRENT_PROBLEMS_TEETH_DENTURES: NO
AIMS_PATIENT_AWARENESS: NO AWARENESS
EDENTIA: NO
PATIENT_WEARS_DENTURES: NO

## 2025-04-14 ASSESSMENT — PAIN SCALES - GENERAL: PAINLEVEL_OUTOF10: NO PAIN (0)

## 2025-04-14 NOTE — PATIENT INSTRUCTIONS
Thank you for allowing Gabriela Alexander DNP, APRN to participate in your care.  If you have a scheduling or an appointment question please contact our scheduling department at their direct line [phone].   ALL nursing questions or concerns can be directed to the psychiatry nurses at [phone] or [phone]

## 2025-04-14 NOTE — PROGRESS NOTES
[facility]  OUTPATIENT PSYCHIATRY PROGRESS NOTE     ASSESSMENT & PLAN     Initial Psychiatry Visit Date: 3/6/25    This is a 25 year old female who presents for ongoing psychiatric care and medication management for the following:    (F25.934) Suicidal ideation  Comment: Continues to hear voices telling her to kill herself, etc. But she states she can tell that they are improved.  Plan:   ARIPiprazole (ABILIFY) 2 MG tablet     (F33.3) Severe recurrent major depressive disorder with psychotic features (H)  Comment: slightly improved, stable  Plan:   FLUoxetine (PROZAC) 40 MG capsule   ARIPiprazole (ABILIFY) 2 MG tablet     (F51.3) Sleep walking and eating  Comment: no change  Plan:   Appt with Dr. Lindsey on 4/21/25    (F41.1,  F41.0) Generalized anxiety disorder with panic attacks  Comment: improved, stable  Plan:   FLUoxetine (PROZAC) 40 MG capsule   hydrOXYzine HCl (ATARAX) 25 MG tablet     Continue medications without change. Plan to have her see Dr. Lindsey next week to see if he would like to make any changes and see her back in 1 month.     Medication:   Continue Prozac 40 mg daily  Continue hydroxyzine 25 mg - 50 mg TID prn anxiety  Continue Abilify 2 mg daily    Therapy: none    Referrals: none - will be seeing Dr. Lindsey next Monday     Labs: none    Follow Up: 1 month    Treatment Risk Statement: The risks, benefits, alternatives and potential adverse effects have been discussed and are understood by the patient. The patient agrees to the treatment plan with the ability to do so. The patient understands to call 911 or call/text the Crisis Line at [phone] or report directly to the nearest Emergency Department if urgent or life threatening symptoms present.        HISTORY OF PRESENT ILLNESS     Marimar was last seen in clinic on 3/6/25 (initial appt).  At that time we increased Prozac to 40 mg daily; increased hydroxyzine to 25 mg - 50 mg TID prn and started Abilify 2 mg daily to target  "depression, racing thoughts, and the voices. We will start at low dose, 2 mg, but explained we may need to go higher than depression dose of 10 mg to target the voices. Will see how she tolerates first.   Could consider Topamax in the future to help with mood stability and that may help with appetite suppression as well.   Referral sent to Sleep medicine for sleepwalking and eating (F51.3)      Today:    Marimar presents for ongoing medication management and psychiatric care. Marimar attended the appointment alone.    Going really good. Does slightly have suicidal thoughts, they are not as intrusive, she is not picturing herself in the basement on the rope, but is hearing the voice say \"I want to die\". Still hears it every day, multiple times a day \"People don't love me, I should just not be here\". But she says that is still less than it was. She doesn't feel the need to go to the basement and that indicates to herself that she is improving. She would like to keep medications at current doses, and wait for another appointment.     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    Will be seeing Dr. Lindsey in sleep medicine on 4/21/25. That might be a good time to follow up, after we have his recommendations to talk about.       PSYCHIATRIC REVIEW OF SYSTEMS     Mood: suicidal ideation without plan, without intent, self-destructive thoughts, depressed mood, anhedonia, low energy, appetite changes, weight changes, poor concentration /memory, feeling worthless, excessive guilt, overwhelmed, and mood dysregulation    Anxiety/Panic attacks: About the same; \"I'm just worried about literally everything\" nervousness, panic, worries, feeling " nervous or on edge, uncontrolled worrying, trouble relaxing, restlessness, easily annoyed or irritable, excessive worry and nervous/overwhelmed, constantly sweating   Sleep: terrible - recurring nightmare that someone is breaking into her house. When she naps during the day she doesn't have that problem. Either someone is coming through her window or breaking her door down. Sleepwalking continues - woke up this morning to a bag of chips on her vanity and doesn't remember eating it.  Appetite: no changes with her appetite during the day; feels she may be eating more than she should be.  Concentration: struggles with concentration and focus, seems to be managing ok for now. Shares that she felt she was overmedicated prevouisly with stimulants and doesn't want to be treated currently.  Energy/Fatigue: has been very tired, has been taking a lot of naps lately. More recent.  Current psychosocial stressors: trauma, limited social support and mental health symptoms  Substance Use:  cannabis  Suicidal Ideation: There is suicidal ideation present but no plan or intent  Homicidal Ideation: Denies homicidal ideation         REVIEW OF SYSTEMS     Allergies: Patient has no known allergies.      Vital Signs: LMP 02/11/2025 (Exact Date)         Weight:   Wt Readings from Last 4 Encounters:   03/06/25 78 kg (172 lb)   02/21/25 78 kg (172 lb)   01/13/25 76.3 kg (168 lb 4.8 oz)   07/05/24 64.9 kg (143 lb)        BMI: There is no height or weight on file to calculate BMI.    Medical diagnoses:   Past Medical History:   Diagnosis Date    Streptococcal pharyngitis       Physical Exam: Please refer to physical exam completed by primary care provider.    Review of systems is negative unless noted above.       MEDICATIONS                                                                                                                                                                 BOLD psych meds     Psychiatry medications were reviewed for  accuracy and compliance. No observed or reported concerns with side effects.    Current Outpatient Medications   Medication Sig Dispense Refill    ARIPiprazole (ABILIFY) 2 MG tablet Take 1 tablet (2 mg) by mouth daily. 30 tablet 1    ASHWAGANDHA PO Take by mouth.      desogestrel-ethinyl estradiol (ISIBLOOM) 0.15-30 MG-MCG tablet TAKE 1 TABLET BY MOUTH DAILY 84 tablet 1    FLUoxetine (PROZAC) 40 MG capsule Take 1 capsule (40 mg) by mouth daily. 30 capsule 2    hydrOXYzine HCl (ATARAX) 25 MG tablet Take 1-2 tablets (25-50 mg) by mouth 3 times daily as needed for anxiety. 90 tablet 2    levothyroxine (SYNTHROID/LEVOTHROID) 25 MCG tablet Take 1 tablet (25 mcg) by mouth every morning (before breakfast). 30 tablet 1    medical cannabis (Patient's own supply) Take as prescribed- vape.      multivitamin w/minerals (THERA-VIT-M) tablet Take 1 tablet by mouth daily. 30 tablet 3    valACYclovir (VALTREX) 500 MG tablet TAKE 1 TABLET BY MOUTH DAILY 30 tablet 4     No current facility-administered medications for this visit.     Abnormal Involuntary Movement (AIMS) Assessment:   FACIAL AND ORAL MOVEMENTS  Muscles of Facial Expression: None  Lips and Perioral Area: None  Jaw: None  Tounge: None  EXTREMITY MOVEMENTS  Upper Extremities: None  Lower Extremities: None  TRUNK MOVEMENTS  Trunk Movements: None  GLOBAL STATUS  Severity of Abnormal Movements: None  Incapacitation due to abnormal movements: None  Patient's Awareness of Abnormal Movements: No Awareness  Total AIMS Score: 0  DENTAL STATUS  Current Problems with Teeth or Dentures: No  Are dentures worn?: No  Ed[place]?: No    PDMP Review         Value Time User    State PDMP site checked  Yes 3/9/2025 10:17 AM Gabriela Alexander APRN CNP          Reviewed PDMP: N/A     Past medication trials:   Trazodone (not good)  Vyvanse, Adderall  Prazosin  Lexapro, Effexor        MENTAL STATUS EXAM / PHQ-9 AND ANAYA-7 / SUICIDE RISK ASSESSMENT     Appearance:  awake, alert, adequately groomed,  and appeared as age stated  Attitude:  cooperative  Eye Contact:  good  Mood:  good  Affect:  appropriate and in normal range, mood congruent, and intensity is normal  Speech:  clear, coherent  Psychomotor Behavior:  no evidence of tardive dyskinesia, dystonia, or tics and intact station, gait and muscle tone  Thought Process:  logical, linear, and goal oriented  Associations:  no loose associations  Thought Content:  passive suicidal ideation present, auditory hallucinations present, and no visual hallucinations present  Insight:  fair  Judgment:  intact  Oriented to:  time, person, and place  Attention Span and Concentration:  intact  Recent and Remote Memory:  intact  Language: no problems  Fund of Knowledge: appropriate  Muscle Strength and Tone: normal  Gait and Station: Normal    These cognitive functions grossly appear as described, but were not formally tested.    Reviewed PHQ-9 and ANAYA-7 screenings        8/25/2022     7:00 AM 1/13/2025     3:09 PM 3/6/2025     9:51 AM   PHQ   PHQ-9 Total Score 24 12  13    Q9: Thoughts of better off dead/self-harm past 2 weeks Several days Several days More than half the days   F/U: Thoughts of suicide or self-harm  Yes Yes   F/U: Self harm-plan  Yes Yes   F/U: Self-harm action  No No   F/U: Safety concerns  No No       Patient-reported          6/3/2024     1:56 PM 1/13/2025     3:09 PM 3/6/2025     9:51 AM   ANAYA-7 SCORE   Total Score 7 (mild anxiety) 8 (mild anxiety) 11 (moderate anxiety)   Total Score 7 8  11        Patient-reported     Suicide Risk Assessment  Suicidal Ideation Suicidal thoughts   Plan No   Intent No   Suicidal or self-harm behaviors none     Risk Factors Severe anxiety/stress  Minimal support system   Protective Factors Denies suicidal plan or intent  Expresses willingness to reach out to support systems if having active thoughts of suicide  Expresses desire to live  Forward thinking  Willingness to engage in mental health treatment  History of  voluntarily seeking help  History of good follow-through with outpatient care  Sense of responsibility to others  No concerns of substance use issues  Medication adherent  Strong sense of Lutheran beliefs that prohibit suicide  No recent discharges from inpatient psychiatry or the emergency department   Given the current protective factors as compared to risk factors, patient is appropriate for the following level of monitoring and plan: Outpatient planning is appropriate - protective factors outweigh risk factors and based on all available evidence including the factors cited above, patient does not appear to be at imminent risk for suicide, does not meet criteria for a 72-hr hold, and therefore remains appropriate for ongoing outpatient level of care.        ATTESTATION      Gabriela Alexander, DNP, APRN, PMHNP-BC    29 minutes spent on the date of the encounter doing chart review, patient visit, and documentation. Provided supportive psychotherapy, including psychoeducation about symptoms, prognosis, empathetic listening, encouragement, and cognitive reframing interventions targeting patient's perceptions of symptoms and psychosocial impacts with goal of promoting active pursuit of treatment options.    The longitudinal plan of care for the diagnosis(es)/condition(s) as documented were addressed during this visit. Due to the added complexity in care, I will continue to support Carolina in the subsequent management and with ongoing continuity of care.

## 2025-04-14 NOTE — TELEPHONE ENCOUNTER
Called and spoke with patient per PCP request, patient not having any symptoms other than itching when vaginal area is touched. Wants to rule out fungal infection. Relayed that this order can be placed by PCP and she can do lab only today, otherwise if wanting full exam would recommend scheduling with GYN. Patient in agreement for lab only, scheduled prior to psych appointment this afternoon. Routed to PCP to sign swab, patient will call back with any questions or concerns.

## 2025-04-15 ENCOUNTER — OFFICE VISIT (OUTPATIENT)
Dept: OBGYN | Facility: OTHER | Age: 25
End: 2025-04-15
Attending: NURSE PRACTITIONER
Payer: COMMERCIAL

## 2025-04-15 VITALS
SYSTOLIC BLOOD PRESSURE: 110 MMHG | HEIGHT: 60 IN | HEART RATE: 60 BPM | WEIGHT: 172 LBS | BODY MASS INDEX: 33.77 KG/M2 | DIASTOLIC BLOOD PRESSURE: 72 MMHG

## 2025-04-15 DIAGNOSIS — N89.8 VAGINAL SORE: Primary | ICD-10-CM

## 2025-04-15 DIAGNOSIS — B37.31 YEAST INFECTION OF THE VAGINA: ICD-10-CM

## 2025-04-15 PROCEDURE — G0463 HOSPITAL OUTPT CLINIC VISIT: HCPCS | Mod: 25

## 2025-04-15 PROCEDURE — 87529 HSV DNA AMP PROBE: CPT | Mod: ZL | Performed by: NURSE PRACTITIONER

## 2025-04-15 RX ORDER — FLUCONAZOLE 150 MG/1
TABLET ORAL
Qty: 15 TABLET | Refills: 0 | Status: SHIPPED | OUTPATIENT
Start: 2025-04-15 | End: 2025-07-23

## 2025-04-15 ASSESSMENT — PAIN SCALES - GENERAL: PAINLEVEL_OUTOF10: NO PAIN (0)

## 2025-04-16 ENCOUNTER — LAB (OUTPATIENT)
Dept: LAB | Facility: OTHER | Age: 25
End: 2025-04-16
Payer: COMMERCIAL

## 2025-04-16 ENCOUNTER — MYC MEDICAL ADVICE (OUTPATIENT)
Dept: OBGYN | Facility: OTHER | Age: 25
End: 2025-04-16

## 2025-04-16 DIAGNOSIS — N89.8 VAGINAL SORE: ICD-10-CM

## 2025-04-16 DIAGNOSIS — R79.89 TSH ELEVATION: ICD-10-CM

## 2025-04-16 LAB
BASOPHILS # BLD AUTO: 0.1 10E3/UL (ref 0–0.2)
BASOPHILS NFR BLD AUTO: 1 %
EOSINOPHIL # BLD AUTO: 0.2 10E3/UL (ref 0–0.7)
EOSINOPHIL NFR BLD AUTO: 3 %
ERYTHROCYTE [DISTWIDTH] IN BLOOD BY AUTOMATED COUNT: 12 % (ref 10–15)
HCT VFR BLD AUTO: 44.2 % (ref 35–47)
HGB BLD-MCNC: 14.9 G/DL (ref 11.7–15.7)
HSV1 DNA SPEC QL NAA+PROBE: NOT DETECTED
HSV2 DNA SPEC QL NAA+PROBE: NOT DETECTED
IMM GRANULOCYTES # BLD: 0 10E3/UL
IMM GRANULOCYTES NFR BLD: 0 %
LYMPHOCYTES # BLD AUTO: 2.2 10E3/UL (ref 0.8–5.3)
LYMPHOCYTES NFR BLD AUTO: 30 %
MCH RBC QN AUTO: 30.7 PG (ref 26.5–33)
MCHC RBC AUTO-ENTMCNC: 33.7 G/DL (ref 31.5–36.5)
MCV RBC AUTO: 91 FL (ref 78–100)
MONOCYTES # BLD AUTO: 0.7 10E3/UL (ref 0–1.3)
MONOCYTES NFR BLD AUTO: 10 %
NEUTROPHILS # BLD AUTO: 4.1 10E3/UL (ref 1.6–8.3)
NEUTROPHILS NFR BLD AUTO: 56 %
NRBC # BLD AUTO: 0 10E3/UL
NRBC BLD AUTO-RTO: 0 /100
PLATELET # BLD AUTO: 372 10E3/UL (ref 150–450)
RBC # BLD AUTO: 4.85 10E6/UL (ref 3.8–5.2)
SPECIMEN TYPE: NORMAL
T4 FREE SERPL-MCNC: 1.09 NG/DL (ref 0.9–1.7)
TSH SERPL DL<=0.005 MIU/L-ACNC: 1.26 UIU/ML (ref 0.3–4.2)
WBC # BLD AUTO: 7.3 10E3/UL (ref 4–11)

## 2025-04-16 PROCEDURE — 84443 ASSAY THYROID STIM HORMONE: CPT | Mod: ZL

## 2025-04-16 PROCEDURE — 85004 AUTOMATED DIFF WBC COUNT: CPT | Mod: ZL

## 2025-04-16 PROCEDURE — 84439 ASSAY OF FREE THYROXINE: CPT | Mod: ZL

## 2025-04-16 PROCEDURE — 86780 TREPONEMA PALLIDUM: CPT | Mod: ZL

## 2025-04-16 PROCEDURE — 36415 COLL VENOUS BLD VENIPUNCTURE: CPT | Mod: ZL

## 2025-04-16 PROCEDURE — 87389 HIV-1 AG W/HIV-1&-2 AB AG IA: CPT | Mod: ZL

## 2025-04-16 NOTE — PROGRESS NOTES
[facility]                HPI     Here today for concerns of recurrent vaginal yeast infection and vaginal sore.  She reports over 6 yeast infections in the past year and has been treated each time with diflucan 150 mg single oral dose.  She does not feel like the infection is going away between treatments.  She is using hypoallergenic products and is not applying any creams or wipes.  Over the past several days she has developed a small reddened area that is also painful. Denies trauma or past infection / lesion.              Medications:     Current Outpatient Medications   Medication Sig Dispense Refill    ARIPiprazole (ABILIFY) 2 MG tablet Take 1 tablet (2 mg) by mouth daily. 30 tablet 1    ASHWAGANDHA PO Take by mouth.      desogestrel-ethinyl estradiol (ISIBLOOM) 0.15-30 MG-MCG tablet TAKE 1 TABLET BY MOUTH DAILY 84 tablet 1    fluconazole (DIFLUCAN) 150 MG tablet Take 1 tablet (150 mg) by mouth every 3 days for 9 days, THEN 1 tablet (150 mg) once a week. 15 tablet 0    FLUoxetine (PROZAC) 40 MG capsule Take 1 capsule (40 mg) by mouth daily. 30 capsule 2    hydrOXYzine HCl (ATARAX) 25 MG tablet Take 1-2 tablets (25-50 mg) by mouth 3 times daily as needed for anxiety. 90 tablet 2    levothyroxine (SYNTHROID/LEVOTHROID) 25 MCG tablet Take 1 tablet (25 mcg) by mouth every morning (before breakfast). 30 tablet 1    medical cannabis (Patient's own supply) Take as prescribed- vape.      multivitamin w/minerals (THERA-VIT-M) tablet Take 1 tablet by mouth daily. 30 tablet 3    valACYclovir (VALTREX) 500 MG tablet TAKE 1 TABLET BY MOUTH DAILY 30 tablet 4     No current facility-administered medications for this visit.                Allergies:   Patient has no known allergies.         Review of Systems:     The 5 point Review of Systems is negative other than noted in the HPI                     Physical Exam:   Blood pressure 110/72, pulse 60, height 1.524 m (5'), weight 78 kg (172 lb), last  menstrual period 02/11/2025, not currently breastfeeding.  Constitutional:   awake, alert, cooperative, no apparent distress, and appears stated age     Genitounirinary:   External Genitalia:  Hair distribution; normal, Abnormal findings: small red papules present on right labia majora and one present in the clitoral burrell.  No ulceration or drainage.    Vagina:  Discharge absent, Lesions absent  Cervix:  Lesions absent, Discharge absent, Tenderness absent              Data:     Results for orders placed or performed in visit on 04/15/25   Herpes Simplex Virus 1&2 by PCR     Status: None    Specimen: Vulva; Swab   Result Value Ref Range    Herpes Simplex Virus 1 DNA Not Detected Not Detected    Herpes Simplex Virus 2 DNA Not Detected Not Detected    Herpes Simplex Virus 1&2 Qual PCR Specimen Type Swab     Narrative    The [employer] Molecular Simplexa HSV 1 & 2 Direct assay on the [employer] instrument is a FDA-approved, real-time PCR test for the qualitative detection and differentiation of Herpes Simplex virus Type 1 & 2 DNA from patients with signs and symptoms of HSV-1 or 2 infection.              Assessment and Plan:     Recurrent vaginal yeast infection - she has not picked up treatment yet from JOHNNIE Kemp NP.  Will switch this to a 3 day or al treatment followed by once weekly for 12 weeks.      Vaginal sore - no HSV present on swab.  Attribute sores to current yeast.  Follow up if not resolving with treatment.      Stacie Johnson NP, NP  4/16/2025  10:21 AM

## 2025-04-17 LAB
HIV 1+2 AB+HIV1 P24 AG SERPL QL IA: NONREACTIVE
T PALLIDUM AB SER QL: NONREACTIVE

## 2025-04-21 ENCOUNTER — VIRTUAL VISIT (OUTPATIENT)
Dept: PULMONOLOGY | Facility: OTHER | Age: 25
End: 2025-04-21
Attending: FAMILY MEDICINE
Payer: COMMERCIAL

## 2025-04-21 VITALS — BODY MASS INDEX: 33.77 KG/M2 | WEIGHT: 172 LBS | HEIGHT: 60 IN

## 2025-04-21 DIAGNOSIS — J35.3 ADENOTONSILLAR HYPERTROPHY: ICD-10-CM

## 2025-04-21 DIAGNOSIS — F33.3 SEVERE RECURRENT MAJOR DEPRESSIVE DISORDER WITH PSYCHOTIC FEATURES (H): ICD-10-CM

## 2025-04-21 DIAGNOSIS — F41.1 GENERALIZED ANXIETY DISORDER WITH PANIC ATTACKS: ICD-10-CM

## 2025-04-21 DIAGNOSIS — F41.0 GENERALIZED ANXIETY DISORDER WITH PANIC ATTACKS: ICD-10-CM

## 2025-04-21 DIAGNOSIS — G47.8 SLEEP RELATED EATING DISORDER: Primary | ICD-10-CM

## 2025-04-21 ASSESSMENT — PAIN SCALES - GENERAL: PAINLEVEL_OUTOF10: NO PAIN (0)

## 2025-04-21 NOTE — PROGRESS NOTES
"Virtual Visit Details    Type of service:  Video Visit   Video Start Time:  3:30pm  Video End Time: 4pm    Originating Location (pt. Location): Home    Distant Location (provider location):  On-site  Platform used for Video Visit: Valdez Jarrett is a 25 year old female who is being evaluated via a billable video visit.       The patient has been notified of following:      \"This video visit will be conducted via a call between you and your physician/provider. We have found that certain health care needs can be provided without the need for an in-person physical exam.  This service lets us provide the care you need with a video conversation.  If a prescription is necessary we can send it directly to your pharmacy.  If lab work is needed we can place an order for that and you can then stop by our lab to have the test done at a later time.     Video visits are billed at different rates depending on your insurance coverage.  Please reach out to your insurance provider with any questions.     If during the course of the call the physician/provider feels a video visit is not appropriate, you will not be charged for this service.\"     Patient has given verbal consent for Video visit? Yes  How would you like to obtain your AVS? Mail a copy  If you are dropped from the video visit, the video invite should be resent to: Text to cell phone: -  Will anyone else be joining your video visit? No  If patient encounters technical issues they should call [phone]      Video-Visit Details     Type of service:  Video Visit     Virtual visit for abnormal nocturnal behaviors.     A/P:     1.)  Chronic insomnia  2.)  Abnormal nocturnal behaviors -appearing most consistent with sleep-related eating disorder (SRED)  3.)  MDD -     Our plan for today was to start with a in-lab diagnostic PSG with 4 limb EMG monitoring to evaluate for any form of sleep disruption including sleep disordered breathing, rhythmic movements, " hypoxemia or cardiac related.  If no significant findings, then I would plan to coordinate with Gabriela Alexander regarding potential pharmacotherapy options and ensuring no interactions with her current regiment.  I also would asked regarding the aripiprazole, since there may be some temporal link between the start of this and nighttime behaviors.  Otherwise, I would typically consider trying a low-dose of topiramate, low-dose benzodiazepine such as clonazepam or low-dose imipramine.  We also discussed overall safety recommendations for non-REM parasomnias.    Recommended Sleep Testing/Procedures:    Adult, PSG/Diagnostic (Bed 2 or Bed 1), and 4-limb EMG      SUBJECTIVE:  Marimar Jarrett is a 25 year old female.    25 year old female who is referred for abnormal nocturnal behaviors.     Pertinent PMHx of abnormal nocturnal behaviors, severe MDD with psychotic features including command hallucinations for self-harm, ANAYA, adenotonsillar hypertrophy, obesity (adult class 1).     Reviewed her visit with Gabriela Alexander on 3/6/2025.  Noted here for concerns regarding sleep walking and eating.  On visit 4/14/2025, overall improvement in mood, reduction in SI and auditory hallucinations.     STOP-BANG score of 1, with unknown neck circumference.  [place] score of 8.  HEATH: 24    Today -we reviewed her history regarding abnormal behaviors during sleep.    She feels that these issues have been occurring for the last 3 months and denies previous concerns prior to this for evidence of any abnormal nocturnal behaviors including sleepwalking, dream acting behavior.  She does believe that she had some amount of sleepwalking as a child, though does not know specific details.    She is not aware of any significant medication changes, changes to sleep or wake pattern, social stressors that would seem to be linked to these events.    Currently, on at least 5 nights per week she will find either evidence in her bed or in the kitchen that she  "had consumed food during the night.  On a few occasions, she has \"awoken\" while in the kitchen and this occurred around 1-2 AM.  In general the foods that she consumes are most often things like small bags of chips, cookies.  She has never used the microwave or oven during these.  She has opened up refrigerator at least once where she did consume a cold cooked hamburger lydia.  In general, she is not aware of these behaviors and does not recall these the next day.    She denies any urination in her bed or other parts of the house.  There has been least 1 events where she is awoken wearing different close that she recalls wearing to bed and suspects that she changed her clothes during the night.    Most nights she is in bed around 7:30 PM, she estimates she is asleep around 9 PM.  There are times where she can awaken with a feeling of a panic, but frequency is unclear.  She will awaken typically between 530-6 AM, but as late as 7 AM.  She denies daytime naps.    She denies caffeine use, alcohol use.  She does use daily THC as part of management of panic symptoms, this has been present for at least 5 years.    She does report having some recurring nightmares.    She is not aware of any snoring or observed apnea.    There has been normalization of her TSH.   Latest Reference Range & Units 01/13/25 15:47 02/21/25 15:18 03/06/25 16:23 04/16/25 15:01   TSH 0.30 - 4.20 uIU/mL 4.40 (H) 8.54 (H) 6.58 (H) 1.26   (H): Data is abnormally high    Past medical history:    Patient Active Problem List    Diagnosis Date Noted    Sleep walking and eating 01/13/2025     Priority: Medium    Dog bite of right thigh, subsequent encounter 06/03/2024     Priority: Medium    Suicidal ideation 08/25/2022     Priority: Medium    Severe recurrent major depressive disorder with psychotic features (H) 02/21/2022     Priority: Medium    Generalized anxiety disorder with panic attacks 10/08/2021     Priority: Medium    Recurrent cold sores " 06/10/2021     Priority: Medium    Adenotonsillar hypertrophy 08/16/2013     Priority: Medium       10 point ROS of systems including Constitutional, Eyes, Respiratory, Cardiovascular, Gastroenterology, Genitourinary, Integumentary, Muscularskeletal, Psychiatric were all negative except for pertinent positives noted in my HPI.    Current Outpatient Medications   Medication Sig Dispense Refill    ARIPiprazole (ABILIFY) 2 MG tablet Take 1 tablet (2 mg) by mouth daily. 30 tablet 1    ASHWAGANDHA PO Take by mouth.      desogestrel-ethinyl estradiol (ISIBLOOM) 0.15-30 MG-MCG tablet TAKE 1 TABLET BY MOUTH DAILY 84 tablet 1    fluconazole (DIFLUCAN) 150 MG tablet Take 1 tablet (150 mg) by mouth every 3 days for 9 days, THEN 1 tablet (150 mg) once a week. 15 tablet 0    FLUoxetine (PROZAC) 40 MG capsule Take 1 capsule (40 mg) by mouth daily. 30 capsule 2    hydrOXYzine HCl (ATARAX) 25 MG tablet Take 1-2 tablets (25-50 mg) by mouth 3 times daily as needed for anxiety. 90 tablet 2    levothyroxine (SYNTHROID/LEVOTHROID) 25 MCG tablet Take 1 tablet (25 mcg) by mouth every morning (before breakfast). 30 tablet 1    medical cannabis (Patient's own supply) Take as prescribed- vape.      multivitamin w/minerals (THERA-VIT-M) tablet Take 1 tablet by mouth daily. 30 tablet 3    valACYclovir (VALTREX) 500 MG tablet TAKE 1 TABLET BY MOUTH DAILY 30 tablet 4       OBJECTIVE:  LMP 02/11/2025 (Exact Date)     Physical Exam     ---  This note was written with the assistance of the Dragon voice-dictation technology software. The final document, although reviewed, may contain errors. For corrections, please contact the office.    Total time spent preparing to see the patient, review of chart, obtaining history and physical examination, review of sleep testing, review of treatment options, education, discussion with patient and documenting in Epic / EMR was 35 minutes.  All time involved was spent on the day of service for the patient (the  same day as the patient's appointment).    Declan Lindsey MD    Sleep Medicine  [place], [place]  Main Office: [phone]  [facility] [address], [address]  Schedule visits: [phone]  Main Office: [phone]  Fax: [phone]

## 2025-04-21 NOTE — NURSING NOTE
Current patient location: [address] [place] [address] [address]  [place] [address]    Is the patient currently in the state of [place]? YES    Visit mode: VIDEO    If the visit is dropped, the patient can be reconnected by:VIDEO VISIT: Text to cell phone:   Telephone Information:   Mobile [phone]       Will anyone else be joining the visit? NO  (If patient encounters technical issues they should call [phone]    Are changes needed to the allergy or medication list? No    Are refills needed on medications prescribed by this physician? NO    Rooming Documentation:  Questionnaire(s) not pre-assigned    Per pt, did not receive flu vaccine this year    Reason for visit: Consult    Lorena SANTIZOF

## 2025-04-23 DIAGNOSIS — F51.3 SLEEP WALKING AND EATING: ICD-10-CM

## 2025-04-23 DIAGNOSIS — G47.8 SLEEP RELATED EATING DISORDER: Primary | ICD-10-CM

## 2025-05-16 ENCOUNTER — THERAPY VISIT (OUTPATIENT)
Dept: SLEEP MEDICINE | Facility: HOSPITAL | Age: 25
End: 2025-05-16
Attending: FAMILY MEDICINE
Payer: COMMERCIAL

## 2025-05-16 PROCEDURE — 95810 POLYSOM 6/> YRS 4/> PARAM: CPT

## 2025-05-20 NOTE — PROGRESS NOTES
Diagnostic sleep testing with 4 limb monitoring observed all stages of sleep at an 87% efficiency. Obstructive events showed a RDI of 5.7. Limb movements were noted. O2 dropped to the 70s with some events. Periods of probe artifact possibly contributed to overall lower O2 sats

## 2025-05-29 NOTE — PROGRESS NOTES
"Marimar Jarrett is a 25 year old female who is being evaluated via a billable video visit.       The patient has been notified of following:      \"This video visit will be conducted via a call between you and your physician/provider. We have found that certain health care needs can be provided without the need for an in-person physical exam.  This service lets us provide the care you need with a video conversation.  If a prescription is necessary we can send it directly to your pharmacy.  If lab work is needed we can place an order for that and you can then stop by our lab to have the test done at a later time.     Video visits are billed at different rates depending on your insurance coverage.  Please reach out to your insurance provider with any questions.     If during the course of the call the physician/provider feels a video visit is not appropriate, you will not be charged for this service.\"     Patient has given verbal consent for Video visit? Yes  How would you like to obtain your AVS? Mail a copy  If you are dropped from the video visit, the video invite should be resent to: Text to cell phone: ***  Will anyone else be joining your video visit? No  If patient encounters technical issues they should call [phone]      Video-Visit Details     Type of service:  Video Visit     Start Time: {video visit start/end time:152948}  End Time: {video visit start/end time:152948}    Originating Location (pt. Location): {video visit patient location:770899::\"Home\"}     Distant Location (provider location):  [facility] - {BlankBase Single Select.:864709::\"[place]\", \"[place]\"}       Platform used for Video Visit: {Virtual Visit Platforms:864880::\"AmWell\"}    Virtual visit for ***.     A/P:     1.)  Chronic insomnia  2.)  Abnormal nocturnal behaviors -appearing most consistent with sleep-related eating disorder (SRED)  3.)  MDD with psychosis - under care of psychiatry    Overall, no significant " sleep-disordered breathing on PSG, though with some infrequent PLM's and increased spontaneous cortical arousal index.  I would not suspect the PLM's would explain or be the primary cause for presumed NREM parasomnia / SRED.    Treatment options to consider would be low-dose of topiramate, low-dose benzodiazepine such as clonazepam or low-dose imipramine.  Given the increased spontaneous cortical arousal index, my first choice if appropriate would be clonazepam.      Given her significant depression with psychosis history, I would plan to coordinate with Gabriela Alexander regarding potential pharmacotherapy options and ensuring no interactions with her current regiment.    ***     Our plan for today was to start with a in-lab diagnostic PSG with 4 limb EMG monitoring to evaluate for any form of sleep disruption including sleep disordered breathing, rhythmic movements, hypoxemia or cardiac related.  If no significant findings, then I would plan to coordinate with Gabriela Alexander regarding potential pharmacotherapy options and ensuring no interactions with her current regiment.  I also would asked regarding the aripiprazole, since there may be some temporal link between the start of this and nighttime behaviors.  Otherwise, I would typically consider trying a low-dose of topiramate, low-dose benzodiazepine such as clonazepam or low-dose imipramine.  We also discussed overall safety recommendations for non-REM parasomnias.    SUBJECTIVE:  Marimar Jarrett is a 25 year old female.    Pertinent PMHx of abnormal nocturnal behaviors, severe MDD with psychotic features including command hallucinations for self-harm, ANAYA, adenotonsillar hypertrophy, obesity (adult class 1).     Reviewed her visit with Gabriela Alexander on 3/6/2025.  Noted here for concerns regarding sleep walking and eating.  On visit 4/14/2025, overall improvement in mood, reduction in SI and auditory hallucinations.     STOP-BANG score of 1, with unknown neck  "circumference.  [place] score of 8.  HEATH: 24     4/21/2025 -we reviewed her history regarding abnormal behaviors during sleep.     She feels that these issues have been occurring for the last 3 months and denies previous concerns prior to this for evidence of any abnormal nocturnal behaviors including sleepwalking, dream acting behavior.  She does believe that she had some amount of sleepwalking as a child, though does not know specific details.     She is not aware of any significant medication changes, changes to sleep or wake pattern, social stressors that would seem to be linked to these events.     Currently, on at least 5 nights per week she will find either evidence in her bed or in the kitchen that she had consumed food during the night.  On a few occasions, she has \"awoken\" while in the kitchen and this occurred around 1-2 AM.  In general the foods that she consumes are most often things like small bags of chips, cookies.  She has never used the microwave or oven during these.  She has opened up refrigerator at least once where she did consume a cold cooked hamburger lydia.  In general, she is not aware of these behaviors and does not recall these the next day.     She denies any urination in her bed or other parts of the house.  There has been least 1 events where she is awoken wearing different close that she recalls wearing to bed and suspects that she changed her clothes during the night.     Most nights she is in bed around 7:30 PM, she estimates she is asleep around 9 PM.  There are times where she can awaken with a feeling of a panic, but frequency is unclear.  She will awaken typically between 530-6 AM, but as late as 7 AM.  She denies daytime naps.     She denies caffeine use, alcohol use.  She does use daily THC as part of management of panic symptoms, this has been present for at least 5 years.     She does report having some recurring nightmares.     She is not aware of any snoring or observed " apnea.     There has been normalization of her TSH.    Latest Reference Range & Units 01/13/25 15:47 02/21/25 15:18 03/06/25 16:23 04/16/25 15:01   TSH 0.30 - 4.20 uIU/mL 4.40 (H) 8.54 (H) 6.58 (H) 1.26   (H): Data is abnormally high    A/P for in-lab PSG with 4-limb EMG.    Today - ***.    SLEEP STUDY INTERPRETATION  DIAGNOSTIC POLYSOMNOGRAPHY REPORT        Patient: Marimar Jarrett  YOB: 2000  Study Date: 5/16/2025  MRN: -  Referring Provider: Keri Kemp APRN CNP  Ordering Provider: Declan Lindsey MD     Indications for Polysomnography: The patient is a 25 year old Female who is 5' and weighs 172.0 lbs. Her BMI is 33.8, [place] sleepiness scale 8 and neck circumference is - cm. Relevant medical history includes abnormal nocturnal behaviors, severe MDD with psychotic features including command hallucinations for self-harm, ANAYA, adenotonsillar hypertrophy, obesity (adult class 1). A diagnostic polysomnogram was performed to evaluate for sleep apnea and abnormal nocturnal behaviors.     Polysomnogram Data: A full night polysomnogram recorded the standard physiologic parameters including EEG, EOG, EMG, ECG, nasal and oral airflow. Respiratory parameters of chest and abdominal movements were recorded with respiratory inductance plethysmography. Oxygen saturation was recorded by pulse oximetry. Hypopnea scoring rule used: 1B 4%.     Sleep Architecture: Delayed sleep onset and REM latency, no supine REM observed, increased arousal index.  Seen to have persistent slow rolling eye movements in N2.  The total recording time of the polysomnogram was 504.9 minutes. The total sleep time was 439.0 minutes. Sleep latency was delayed at 35.8 minutes unknown if use of a sleep aid. REM latency was 287.0 minutes. Arousal index was increased at 26.9 arousals per hour. Sleep efficiency was normal at 87.0%. Wake after sleep onset was 30.0 minutes. The patient spent 20.4% of total sleep time in Stage N1, 54.2%  in Stage N2, 12.3% in Stage N3, and 13.1% in REM. Time in REM supine was - minutes.     Respiration: Borderline mild VALERIA (AHI 3.8, RDI 5.7) without sleep-associated hypoxemia (hypoxemia noted is presumed artifact).  Potential that severity under-reported given no supine REM observed.  Events - The polysomnogram revealed a presence of 1 obstructive, - central, and - mixed apneas resulting in an apnea index of 0.1 events per hour. There were 27 obstructive hypopneas and - central hypopneas resulting in an obstructive hypopnea index of 3.7 and central hypopnea index of - events per hour. The combined apnea/hypopnea index was 3.8 events per hour (central apnea/hypopnea index was - events per hour). The REM AHI was 17.7 events per hour. The supine AHI was 0.8 events per hour. The RERA index was 1.9 events per hour.  The RDI was 5.7 events per hour.  Snoring - was reported as absent.  Respiratory rate and pattern - was notable for normal respiratory rate and pattern.  Sustained Sleep Associated Hypoventilation - Transcutaneous carbon dioxide monitoring was not used, however significant hypoventilation was not suggested by oximetry.  Sleep Associated Hypoxemia - (Greater than 5 minutes O2 sat at or below 88%) was present, though likely represent artifact. Baseline oxygen saturation was 95.1%. Lowest oxygen saturation was 67.0%. Time spent less than or equal to 88% was 21.2 minutes. Time spent less than or equal to 89% was 26.4 minutes.     Movement Activity: Infrequent PLM's observed, no other abnormal nocturnal behaviors noted.  Periodic Limb Activity - There were 60 PLMs during the entire study. The PLM index was 8.2 movements per hour. The PLM Arousal Index was 1.8 per hour.  REM EMG Activity - Excessive transient/sustained muscle activity was not present.  Nocturnal Behavior - Abnormal sleep related behaviors were not noted during/arising out of NREM / REM sleep.   Bruxism - None apparent.     Cardiac Summary: Appears  NSR  The average pulse rate was 68.1 bpm. The minimum pulse rate was 30.0 bpm while the maximum pulse rate was 114.0 bpm.       Assessment:   Delayed sleep onset and REM latency, no supine REM observed, increased arousal index.  Seen to have persistent slow rolling eye movements in N2.  Borderline mild VALERIA (AHI 3.8, RDI 5.7) without sleep-associated hypoxemia (hypoxemia noted is presumed artifact).  Potential that severity under-reported given no supine REM observed.  Infrequent PLM's observed, no other abnormal nocturnal behaviors noted.     Recommendations:  Consider repeat polysomnography with full night titration of positive airway pressure therapy for the control of sleep disordered breathing.  Based on the presence of mild obstructive sleep apnea and excessive daytime sleepiness, treatment could be empirically initiated with Auto-titrating PAP therapy with a range of 5 to 15 cmH2O. Recommend clinical follow up with sleep management team.  Patient may be a candidate for dental appliance through referral to Sleep Dentistry for the treatment of obstructive sleep apnea and/or socially disruptive snoring.  If devices are not acceptable or effective, patient may benefit from evaluation of possible surgical options. If she is interested, would recommend referral to specialized ENT-Sleep provider.  Advice regarding the risks of drowsy driving.  Suggest optimizing sleep schedule and avoiding sleep deprivation.  Weight management (if BMI > 30).  Pharmacologic therapy should be used for management of restless legs syndrome only if present and clinically indicated and not based on the presence of periodic limb movements alone.     Diagnostic Codes:   Obstructive Sleep Apnea G47.33  Unspecified Sleep Disturbance G47.9  Periodic Limb Movement Disorder G47.61     _____________________________________   Electronically Signed By: Declan Lindsey MD (5/30/2025)         Past medical history:    Patient Active Problem List     Diagnosis Date Noted     Sleep walking and eating 01/13/2025     Priority: Medium     Dog bite of right thigh, subsequent encounter 06/03/2024     Priority: Medium     Suicidal ideation 08/25/2022     Priority: Medium     Severe recurrent major depressive disorder with psychotic features (H) 02/21/2022     Priority: Medium     Generalized anxiety disorder with panic attacks 10/08/2021     Priority: Medium     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     Priority: Medium     Adenotonsillar hypertrophy 08/16/2013     Priority: Medium       10 point ROS of systems including Constitutional, Eyes, Respiratory, Cardiovascular, Gastroenterology, Genitourinary, Integumentary, Muscularskeletal, Psychiatric were all negative except for pertinent positives noted in my HPI.    Current Outpatient Medications   Medication Sig Dispense Refill     ARIPiprazole (ABILIFY) 2 MG tablet Take 1 tablet (2 mg) by mouth daily. 30 tablet 1     ASHWAGANDHA PO Take by mouth.       desogestrel-ethinyl estradiol (ISIBLOOM) 0.15-30 MG-MCG tablet TAKE 1 TABLET BY MOUTH DAILY 84 tablet 1     fluconazole (DIFLUCAN) 150 MG tablet Take 1 tablet (150 mg) by mouth every 3 days for 9 days, THEN 1 tablet (150 mg) once a week. 15 tablet 0     FLUoxetine (PROZAC) 40 MG capsule Take 1 capsule (40 mg) by mouth daily. 30 capsule 2     hydrOXYzine HCl (ATARAX) 25 MG tablet Take 1-2 tablets (25-50 mg) by mouth 3 times daily as needed for anxiety. 90 tablet 2     levothyroxine (SYNTHROID/LEVOTHROID) 25 MCG tablet Take 1 tablet (25 mcg) by mouth every morning (before breakfast). 30 tablet 1     medical cannabis (Patient's own supply) Take as prescribed- vape.       multivitamin w/minerals (THERA-VIT-M) tablet Take 1 tablet by mouth daily. 30 tablet 3     valACYclovir (VALTREX) 500 MG tablet TAKE 1 TABLET BY MOUTH DAILY 30 tablet 4       OBJECTIVE:  There were no vitals taken for this visit.    Physical Exam     ---  This note was written with the assistance of the Dragon  voice-dictation technology software. The final document, although reviewed, may contain errors. For corrections, please contact the office.    Total time spent preparing to see the patient, review of chart, obtaining history and physical examination, review of sleep testing, review of treatment options, education, discussion with patient and documenting in Epic / EMR was *** minutes.  All time involved was spent on the day of service for the patient (the same day as the patient's appointment).    Declan Lindsey MD    Sleep Medicine  [place], [place]  Main Office: [phone]  [facility] [place] [address] [facility], [address]  Schedule visits: [phone]  Main Office: [phone]  Fax: [phone]

## 2025-05-30 ENCOUNTER — MYC REFILL (OUTPATIENT)
Dept: PSYCHIATRY | Facility: OTHER | Age: 25
End: 2025-05-30

## 2025-05-30 ENCOUNTER — VIRTUAL VISIT (OUTPATIENT)
Dept: PULMONOLOGY | Facility: OTHER | Age: 25
End: 2025-05-30
Attending: FAMILY MEDICINE
Payer: COMMERCIAL

## 2025-05-30 VITALS — HEIGHT: 62 IN | WEIGHT: 150 LBS | BODY MASS INDEX: 27.6 KG/M2

## 2025-05-30 DIAGNOSIS — G47.8 SLEEP RELATED EATING DISORDER: Primary | ICD-10-CM

## 2025-05-30 DIAGNOSIS — Z91.199 NO-SHOW FOR APPOINTMENT: ICD-10-CM

## 2025-05-30 DIAGNOSIS — F41.1 GENERALIZED ANXIETY DISORDER WITH PANIC ATTACKS: ICD-10-CM

## 2025-05-30 DIAGNOSIS — F41.0 GENERALIZED ANXIETY DISORDER WITH PANIC ATTACKS: ICD-10-CM

## 2025-05-30 DIAGNOSIS — F51.3 SLEEP WALKING AND EATING: ICD-10-CM

## 2025-05-30 RX ORDER — CLONAZEPAM 0.5 MG/1
TABLET ORAL
Qty: 60 TABLET | Refills: 1 | Status: SHIPPED | OUTPATIENT
Start: 2025-05-30

## 2025-05-30 ASSESSMENT — PAIN SCALES - GENERAL: PAINLEVEL_OUTOF10: NO PAIN (0)

## 2025-05-30 NOTE — PROGRESS NOTES
"Virtual Visit Details    Type of service:  Video Visit   Video Start Time: {video visit start/end time for provider to select:855367}  Video End Time:{video visit start/end time for provider to select:069066}    Originating Location (pt. Location): {video visit patient location:817220::\"Home\"}  {PROVIDER LOCATION On-site should be selected for visits conducted from your clinic location or adjoining [facility] hospital, academic office, or other nearby [facility] building. Off-site should be selected for all other provider locations, including home:366058}  Distant Location (provider location):  {virtual location provider:800381}  Platform used for Video Visit: {Virtual Visit Platforms:424449::\"[employer]\"}    "

## 2025-05-30 NOTE — NURSING NOTE
Current patient location: [address] [place] [address] [address]  [place] [address]    Is the patient currently in the state of [place]? YES    Visit mode: VIDEO    If the visit is dropped, the patient can be reconnected by:VIDEO VISIT: Text to cell phone:   Telephone Information:   Mobile [phone]       Will anyone else be joining the visit? NO  (If patient encounters technical issues they should call [phone]    Are changes needed to the allergy or medication list? No    Are refills needed on medications prescribed by this physician? NO    Rooming Documentation:  Questionnaire(s) completed    Reason for visit: RECHECK    Desmond SANTIZOF

## 2025-06-02 RX ORDER — FLUOXETINE HYDROCHLORIDE 40 MG/1
40 CAPSULE ORAL DAILY
Qty: 30 CAPSULE | Refills: 2 | Status: SHIPPED | OUTPATIENT
Start: 2025-06-02

## 2025-06-02 NOTE — TELEPHONE ENCOUNTER
FLUoxetine (PROZAC) 40 MG capsule         Last Written Prescription Date:  3/6/25  Last Fill Quantity: 30,   # refills: 2  Last Office Visit: 4/14/25  Future Office visit:    Next 5 appointments (look out 90 days)      Jun 06, 2025 3:00 PM  (Arrive by 2:45 PM)  Return Visit with KENDRICK Herrera CNP  [facility] ([facility] )  Arrive at:  PSYCHIATRY [address] [place] [id number] [phone]             Routing refill request to provider for review/approval because:  SSRIs Protocol Failed      ANAYA-7 score of less than 5 in past 6 months.    Please review last ANAYA-7 score.        6/3/2024     1:56 PM 1/13/2025     3:09 PM 3/6/2025     9:51 AM   ANAYA-7 SCORE   Total Score 7 (mild anxiety) 8 (mild anxiety) 11 (moderate anxiety)   Total Score 7 8  11

## 2025-06-05 ENCOUNTER — PATIENT OUTREACH (OUTPATIENT)
Dept: CARE COORDINATION | Facility: CLINIC | Age: 25
End: 2025-06-05

## 2025-06-06 ENCOUNTER — OFFICE VISIT (OUTPATIENT)
Dept: PSYCHIATRY | Facility: OTHER | Age: 25
End: 2025-06-06
Payer: COMMERCIAL

## 2025-06-06 VITALS
HEART RATE: 91 BPM | OXYGEN SATURATION: 99 % | WEIGHT: 150 LBS | RESPIRATION RATE: 16 BRPM | DIASTOLIC BLOOD PRESSURE: 72 MMHG | SYSTOLIC BLOOD PRESSURE: 100 MMHG | BODY MASS INDEX: 27.44 KG/M2 | TEMPERATURE: 98.7 F

## 2025-06-06 DIAGNOSIS — F41.0 GENERALIZED ANXIETY DISORDER WITH PANIC ATTACKS: ICD-10-CM

## 2025-06-06 DIAGNOSIS — F33.3 SEVERE RECURRENT MAJOR DEPRESSIVE DISORDER WITH PSYCHOTIC FEATURES (H): ICD-10-CM

## 2025-06-06 DIAGNOSIS — F41.1 GENERALIZED ANXIETY DISORDER WITH PANIC ATTACKS: ICD-10-CM

## 2025-06-06 DIAGNOSIS — G47.8 SLEEP RELATED EATING DISORDER: Primary | ICD-10-CM

## 2025-06-06 PROCEDURE — G0463 HOSPITAL OUTPT CLINIC VISIT: HCPCS

## 2025-06-06 ASSESSMENT — ANXIETY QUESTIONNAIRES
GAD7 TOTAL SCORE: 3
1. FEELING NERVOUS, ANXIOUS, OR ON EDGE: NOT AT ALL
GAD7 TOTAL SCORE: 3
3. WORRYING TOO MUCH ABOUT DIFFERENT THINGS: SEVERAL DAYS
7. FEELING AFRAID AS IF SOMETHING AWFUL MIGHT HAPPEN: SEVERAL DAYS
4. TROUBLE RELAXING: NOT AT ALL
5. BEING SO RESTLESS THAT IT IS HARD TO SIT STILL: NOT AT ALL
8. IF YOU CHECKED OFF ANY PROBLEMS, HOW DIFFICULT HAVE THESE MADE IT FOR YOU TO DO YOUR WORK, TAKE CARE OF THINGS AT HOME, OR GET ALONG WITH OTHER PEOPLE?: NOT DIFFICULT AT ALL
2. NOT BEING ABLE TO STOP OR CONTROL WORRYING: SEVERAL DAYS
6. BECOMING EASILY ANNOYED OR IRRITABLE: NOT AT ALL
7. FEELING AFRAID AS IF SOMETHING AWFUL MIGHT HAPPEN: SEVERAL DAYS
GAD7 TOTAL SCORE: 3
IF YOU CHECKED OFF ANY PROBLEMS ON THIS QUESTIONNAIRE, HOW DIFFICULT HAVE THESE PROBLEMS MADE IT FOR YOU TO DO YOUR WORK, TAKE CARE OF THINGS AT HOME, OR GET ALONG WITH OTHER PEOPLE: NOT DIFFICULT AT ALL

## 2025-06-06 ASSESSMENT — PATIENT HEALTH QUESTIONNAIRE - PHQ9
10. IF YOU CHECKED OFF ANY PROBLEMS, HOW DIFFICULT HAVE THESE PROBLEMS MADE IT FOR YOU TO DO YOUR WORK, TAKE CARE OF THINGS AT HOME, OR GET ALONG WITH OTHER PEOPLE: NOT DIFFICULT AT ALL
SUM OF ALL RESPONSES TO PHQ QUESTIONS 1-9: 2
SUM OF ALL RESPONSES TO PHQ QUESTIONS 1-9: 2

## 2025-06-06 ASSESSMENT — PAIN SCALES - GENERAL: PAINLEVEL_OUTOF10: NO PAIN (0)

## 2025-06-06 NOTE — PROGRESS NOTES
[facility]  OUTPATIENT PSYCHIATRY PROGRESS NOTE     ASSESSMENT & PLAN     Initial Psychiatry Visit Date:  3/6/25     This is a 25 year old female who presents for ongoing psychiatric care and medication management for the following:    {DIAG PICKLIST:780082}    ***    Medication:   Stop Abilify    Therapy: ***    Referrals: ***    Labs: ***    Follow Up: 4 months    Treatment Risk Statement: The risks, benefits, alternatives and potential adverse effects have been discussed and are understood by the patient. The patient agrees to the treatment plan with the ability to do so. The patient understands to call 911 or call/text the Crisis Line at [phone] or report directly to the nearest Emergency Department if urgent or life threatening symptoms present.        HISTORY OF PRESENT ILLNESS     Marimar was last seen in clinic on 4/14/25.  At that time we continued medications without change     Today:    Marimar presents for ongoing medication management and psychiatric care. Marimar attended the appointment alone.    Had sleep appt, he put on new med (clonazepam). Haven't   Feeling really good - thinks depression meds might need to go up a little in the winter, but we can discuss    Denies any suicidal thoughts - hasn't had one in a while.    She had only taken the Abilfy for about a week, she had too hard of a time taking it in the morning and was really worried about the weight gain. She feels that she now has a good appetite and actually find herself ful.     Never had a full night's rest without being hungry, felt like she was living another life eating at night. Her daytime fatigue is better.    Dr. Lindsey's note on 5/30/25: ***       PSYCHIATRIC REVIEW OF SYSTEMS     Mood: really good   Anxiety/Panic attacks: ***  Sleep: ***  Appetite: ***  Concentration: no changes, she feels like she is good, able to watch a TV show without problem, she has been doing Legos.  Energy/Fatigue: much better since  starting the clonazepam  Current psychosocial stressors: {family dynamics:693040} {PSYCHOSOCIAL:277578}  Substance Use:  no changes  Suicidal Ideation: Denies suicidal ideation or self-harm  Homicidal Ideation: Denies homicidal ideation         REVIEW OF SYSTEMS     Allergies: Patient has no known allergies.      Vital Signs: /72 (BP Location: Right arm, Patient Position: Sitting, Cuff Size: Adult Regular)   Pulse 91   Temp 98.7  F (37.1  C) (Tympanic)   Resp 16   Wt 68 kg (150 lb)   SpO2 99%   BMI 27.44 kg/m          Weight:   Wt Readings from Last 4 Encounters:   06/06/25 68 kg (150 lb)   05/30/25 68 kg (150 lb)   04/21/25 78 kg (172 lb)   04/15/25 78 kg (172 lb)        BMI: Body mass index is 27.44 kg/m .    Medical diagnoses:   Past Medical History:   Diagnosis Date    Streptococcal pharyngitis       Physical Exam: Please refer to physical exam completed by primary care provider.    Review of systems is negative unless noted above.       MEDICATIONS                                                                                                                                                                 BOLD psych meds     Psychiatry medications were reviewed for accuracy and compliance. No observed or reported concerns with side effects.    Current Outpatient Medications   Medication Sig Dispense Refill    ASHWAGANDHA PO Take by mouth.      clonazePAM (KLONOPIN) 0.5 MG tablet Take 1-2 tablets by mouth 30 minutes before bed.  Start with 1 tablet, ok to increase to 2 tablets after 2 weeks if residual sleep behaviors. 60 tablet 1    desogestrel-ethinyl estradiol (ISIBLOOM) 0.15-30 MG-MCG tablet Take 1 tablet by mouth daily. 84 tablet 1    fluconazole (DIFLUCAN) 150 MG tablet Take 1 tablet (150 mg) by mouth every 3 days for 9 days, THEN 1 tablet (150 mg) once a week. 15 tablet 0    FLUoxetine (PROZAC) 40 MG capsule Take 1 capsule (40 mg) by mouth daily. 30 capsule 2    levothyroxine  (SYNTHROID/LEVOTHROID) 25 MCG tablet Take 1 tablet (25 mcg) by mouth every morning (before breakfast). 30 tablet 1    medical cannabis (Patient's own supply) Take as prescribed- vape.      multivitamin w/minerals (THERA-VIT-M) tablet Take 1 tablet by mouth daily. 30 tablet 3    valACYclovir (VALTREX) 500 MG tablet Take 1 tablet (500 mg) by mouth daily. 30 tablet 4     No current facility-administered medications for this visit.     PDMP Review         Value Time User    State PDMP site checked  Yes 6/6/2025  3:05 PM Gabriela Alexander APRN CNP            Reviewed PDMP: ***        MENTAL STATUS EXAM / PHQ-9 AND ANAYA-7 / SUICIDE RISK ASSESSMENT     Appearance:  { :240885}  Attitude:  { :362910}  Eye Contact:  { :112448}  Mood:  { :270486}  Affect:  { :448515}  Speech:  { :733751}  Psychomotor Behavior:  { :949485}  Thought Process:  { :701852}  Associations:  { :304702}  Thought Content:  { :914976}  Insight:  { :629321}  Judgment:  { :919720}  Oriented to:  { :233479}  Attention Span and Concentration:  { :060653}  Recent and Remote Memory:  { :637184}  Language: {LANGUAGE Description:150477:a}  Fund of Knowledge: { :024119}  Muscle Strength and Tone: { :584756}  Gait and Station: { :182582}    These cognitive functions grossly appear as described, but were not formally tested.    Reviewed PHQ-9 and ANAYA-7 screenings        1/13/2025     3:09 PM 3/6/2025     9:51 AM 6/6/2025    12:21 PM   PHQ   PHQ-9 Total Score 12  13  2    Q9: Thoughts of better off dead/self-harm past 2 weeks Several days More than half the days Not at all   F/U: Thoughts of suicide or self-harm Yes Yes    F/U: Self harm-plan Yes Yes    F/U: Self-harm action No No    F/U: Safety concerns No No        Patient-reported          1/13/2025     3:09 PM 3/6/2025     9:51 AM 6/6/2025    12:21 PM   ANAYA-7 SCORE   Total Score 8 (mild anxiety) 11 (moderate anxiety) 3 (minimal anxiety)   Total Score 8  11  3        Patient-reported     Suicide Risk  "Assessment  Suicidal Ideation {Ideation:895086}   Plan {YES / NO:586718::\"No\"}   Intent {YES / NO:087136::\"No\"}   Suicidal or self-harm behaviors {Behaviors:138505}    Risk Factors {Risk Factors:846981}   Protective Factors {Protective Factors:319945}   Given the current protective factors as compared to risk factors, patient is appropriate for the following level of monitoring and plan: {Monitoring and Plan:616001}       ATTESTATION      Gabriela Alexander, DNP, APRN, PMHNP-BC    *** minutes spent on the date of the encounter doing chart review, patient visit, and documentation. Provided supportive psychotherapy, including psychoeducation about symptoms, prognosis, empathetic listening, encouragement, and cognitive reframing interventions targeting patient's perceptions of symptoms and psychosocial impacts with goal of promoting active pursuit of treatment options.    The longitudinal plan of care for the diagnosis(es)/condition(s) as documented were addressed during this visit. Due to the added complexity in care, I will continue to support Carolina in the subsequent management and with ongoing continuity of care.       " coherent  Psychomotor Behavior:  no evidence of tardive dyskinesia, dystonia, or tics and intact station, gait and muscle tone  Thought Process:  logical, linear, and goal oriented  Associations:  no loose associations  Thought Content:  passive suicidal ideation present, auditory hallucinations present, and no visual hallucinations present  Insight:  fair  Judgment:  intact  Oriented to:  time, person, and place  Attention Span and Concentration:  intact  Recent and Remote Memory:  intact  Language: no problems  Fund of Knowledge: appropriate  Muscle Strength and Tone: normal  Gait and Station: Normal    These cognitive functions grossly appear as described, but were not formally tested.    Reviewed PHQ-9 and ANAYA-7 screenings        1/13/2025     3:09 PM 3/6/2025     9:51 AM 6/6/2025    12:21 PM   PHQ   PHQ-9 Total Score 12  13  2    Q9: Thoughts of better off dead/self-harm past 2 weeks Several days More than half the days Not at all   F/U: Thoughts of suicide or self-harm Yes Yes    F/U: Self harm-plan Yes Yes    F/U: Self-harm action No No    F/U: Safety concerns No No        Patient-reported          1/13/2025     3:09 PM 3/6/2025     9:51 AM 6/6/2025    12:21 PM   ANAYA-7 SCORE   Total Score 8 (mild anxiety) 11 (moderate anxiety) 3 (minimal anxiety)   Total Score 8  11  3        Patient-reported     Suicide Risk Assessment  Suicidal Ideation None   Plan No   Intent No   Suicidal or self-harm behaviors None    Risk Factors Recent psychosocial stressors   Protective Factors Denies suicidal plan or intent  Expresses willingness to reach out to support systems if having active thoughts of suicide  Expresses desire to live  Forward thinking  History of good follow-through with outpatient care  No concerns of substance use issues  Not actively psychotic  Medication adherent   Given the current protective factors as compared to risk factors, patient is appropriate for the following level of monitoring and plan:  Outpatient planning is appropriate - protective factors outweigh risk factors and based on all available evidence including the factors cited above, patient does not appear to be at imminent risk for suicide, does not meet criteria for a 72-hr hold, and therefore remains appropriate for ongoing outpatient level of care.        ATTESTATION      Gabriela Alexander, DNP, APRN, PMHNP-BC    14 minutes spent on the date of the encounter doing chart review, patient visit, and documentation. Provided supportive psychotherapy, including psychoeducation about symptoms, prognosis, empathetic listening, encouragement, and cognitive reframing interventions targeting patient's perceptions of symptoms and psychosocial impacts with goal of promoting active pursuit of treatment options.    The longitudinal plan of care for the diagnosis(es)/condition(s) as documented were addressed during this visit. Due to the added complexity in care, I will continue to support Carolina in the subsequent management and with ongoing continuity of care.

## 2025-06-08 ENCOUNTER — MYC MEDICAL ADVICE (OUTPATIENT)
Dept: PSYCHIATRY | Facility: OTHER | Age: 25
End: 2025-06-08

## 2025-06-09 ENCOUNTER — OFFICE VISIT (OUTPATIENT)
Dept: CHIROPRACTIC MEDICINE | Facility: OTHER | Age: 25
End: 2025-06-09
Attending: CHIROPRACTOR
Payer: COMMERCIAL

## 2025-06-09 DIAGNOSIS — M99.03 SEGMENTAL AND SOMATIC DYSFUNCTION OF LUMBAR REGION: Primary | ICD-10-CM

## 2025-06-09 DIAGNOSIS — M99.01 SEGMENTAL AND SOMATIC DYSFUNCTION OF CERVICAL REGION: ICD-10-CM

## 2025-06-09 DIAGNOSIS — M54.50 ACUTE BILATERAL LOW BACK PAIN WITHOUT SCIATICA: ICD-10-CM

## 2025-06-09 DIAGNOSIS — M99.02 SEGMENTAL AND SOMATIC DYSFUNCTION OF THORACIC REGION: ICD-10-CM

## 2025-06-12 NOTE — PROGRESS NOTES
Subjective Finding:    Chief compalint: No chief complaint on file. , Pain Scale: 5/10, Intensity: sharp, Duration: 2 weeks, Radiating:  bilateral buttock.    Date of injury:     Activities that the pain restricts:   Home/household/hobbies/social activities: Yes.  Work duties: Yes.  Sleep: No.  Makes symptoms better: rest.  Makes symptoms worse: lumbar flexion.  Have you seen anyone else for the symptoms? No.  Work related: No.  Automobile related injury: No.    Objective and Assessment:    Posture Analysis:   High shoulder: .  Head tilt: .  High iliac crest: .  Head carriage: neutral.  Thoracic Kyphosis: neutral.  Lumbar Lordosis: forward.    Lumbar Range of Motion: extension decreased.  Cervical Range of Motion: .  Thoracic Range of Motion: .  Extremity Range of Motion: .    Palpation:   Quad lumb: bilateral, referred pain: no    Segmental dysfunction pre-treatment and treatment area: C3, T7, T8, L4, and L5.    Assessment post-treatment:  Cervical: ROM increased.  Thoracic: ROM increased.  Lumbar: ROM increased.    Comments: .      Complicating Factors: .    Procedure(s):  CMT:  18223 Chiropractic manipulative treatment 3-4 regions performed   Cervical: Diversified, See above for level, Supine, Thoracic: Diversified, See above for level, Prone, and Lumbar: Diversified, See above for level, Side posture    Modalities:  None performed this visit    Therapeutic procedures:  None    Plan:  Treatment plan: 2 times per week for 1 weeks.  Instructed patient: walk 10 minutes.  Short term goals: reduce pain.  Long term goals: increase ADL.  Prognosis: very good.

## 2025-06-26 ENCOUNTER — OFFICE VISIT (OUTPATIENT)
Dept: CHIROPRACTIC MEDICINE | Facility: OTHER | Age: 25
End: 2025-06-26
Attending: CHIROPRACTOR
Payer: COMMERCIAL

## 2025-06-26 DIAGNOSIS — M54.50 ACUTE BILATERAL LOW BACK PAIN WITHOUT SCIATICA: ICD-10-CM

## 2025-06-26 DIAGNOSIS — M99.02 SEGMENTAL AND SOMATIC DYSFUNCTION OF THORACIC REGION: ICD-10-CM

## 2025-06-26 DIAGNOSIS — M99.03 SEGMENTAL AND SOMATIC DYSFUNCTION OF LUMBAR REGION: Primary | ICD-10-CM

## 2025-06-26 DIAGNOSIS — M99.01 SEGMENTAL AND SOMATIC DYSFUNCTION OF CERVICAL REGION: ICD-10-CM

## 2025-06-26 NOTE — PROGRESS NOTES
Subjective Finding:    Chief compalint: Patient presents with:  Back Pain: With left neck pain   , Pain Scale: 3/10, Intensity: dull, Duration: 1 weeks, Radiating: no.    Date of injury:     Activities that the pain restricts:   Home/household/hobbies/social activities: Yes.  Work duties: Yes.  Sleep: No.  Makes symptoms better: rest.  Makes symptoms worse: lumbar flexion.  Have you seen anyone else for the symptoms? No.  Work related: No.  Automobile related injury: No.    Objective and Assessment:    Posture Analysis:   High shoulder: .  Head tilt: .  High iliac crest: .  Head carriage: neutral.  Thoracic Kyphosis: neutral.  Lumbar Lordosis: forward.    Lumbar Range of Motion: extension decreased.  Cervical Range of Motion: left lateral flexion decreased.  Thoracic Range of Motion: .  Extremity Range of Motion: .    Palpation:   Psoas: right, referred pain: no    Segmental dysfunction pre-treatment and treatment area: C4, C6, C7, T1, and L5.    Assessment post-treatment:  Cervical: ROM increased.  Thoracic: ROM increased.  Lumbar: ROM increased.    Comments: .      Complicating Factors: .    Procedure(s):  CMT:  76626 Chiropractic manipulative treatment 3-4 regions performed   Cervical: Diversified, See above for level, Supine, Thoracic: Diversified, See above for level, Prone, and Lumbar: Diversified, See above for level, Side posture    Modalities:  None performed this visit    Therapeutic procedures:  None    Plan:  Treatment plan: PRN.  Instructed patient: stretch as instructed at visit.  Short term goals: reduce pain.  Long term goals: increase ADL.  Prognosis: excellent.

## 2025-08-20 ENCOUNTER — MYC MEDICAL ADVICE (OUTPATIENT)
Dept: PULMONOLOGY | Facility: OTHER | Age: 25
End: 2025-08-20

## 2025-08-24 ENCOUNTER — HEALTH MAINTENANCE LETTER (OUTPATIENT)
Age: 25
End: 2025-08-24

## 2025-08-28 ENCOUNTER — OFFICE VISIT (OUTPATIENT)
Dept: CHIROPRACTIC MEDICINE | Facility: OTHER | Age: 25
End: 2025-08-28
Attending: CHIROPRACTOR
Payer: COMMERCIAL

## 2025-08-28 DIAGNOSIS — M99.02 SEGMENTAL AND SOMATIC DYSFUNCTION OF THORACIC REGION: ICD-10-CM

## 2025-08-28 DIAGNOSIS — M99.01 SEGMENTAL AND SOMATIC DYSFUNCTION OF CERVICAL REGION: ICD-10-CM

## 2025-08-28 DIAGNOSIS — M54.50 ACUTE BILATERAL LOW BACK PAIN WITHOUT SCIATICA: ICD-10-CM

## 2025-08-28 DIAGNOSIS — M99.03 SEGMENTAL AND SOMATIC DYSFUNCTION OF LUMBAR REGION: Primary | ICD-10-CM

## 2025-08-28 PROCEDURE — 98941 CHIROPRACT MANJ 3-4 REGIONS: CPT | Mod: AT | Performed by: CHIROPRACTOR

## 2025-09-03 ENCOUNTER — OFFICE VISIT (OUTPATIENT)
Dept: PEDIATRICS | Facility: OTHER | Age: 25
End: 2025-09-03
Attending: NURSE PRACTITIONER
Payer: COMMERCIAL

## 2025-09-03 ENCOUNTER — MYC REFILL (OUTPATIENT)
Dept: PULMONOLOGY | Facility: OTHER | Age: 25
End: 2025-09-03

## 2025-09-03 VITALS
HEART RATE: 76 BPM | SYSTOLIC BLOOD PRESSURE: 114 MMHG | BODY MASS INDEX: 29.69 KG/M2 | RESPIRATION RATE: 16 BRPM | WEIGHT: 162.3 LBS | DIASTOLIC BLOOD PRESSURE: 80 MMHG | TEMPERATURE: 97.4 F | OXYGEN SATURATION: 98 %

## 2025-09-03 DIAGNOSIS — Z20.2 POSSIBLE EXPOSURE TO SEXUALLY TRANSMITTED INFECTION: Primary | ICD-10-CM

## 2025-09-03 DIAGNOSIS — R79.89 TSH ELEVATION: ICD-10-CM

## 2025-09-03 DIAGNOSIS — B00.1 RECURRENT COLD SORES: ICD-10-CM

## 2025-09-03 DIAGNOSIS — F41.1 GENERALIZED ANXIETY DISORDER WITH PANIC ATTACKS: ICD-10-CM

## 2025-09-03 DIAGNOSIS — F51.3 SLEEP WALKING AND EATING: ICD-10-CM

## 2025-09-03 DIAGNOSIS — F51.3 SLEEPWALKING: ICD-10-CM

## 2025-09-03 DIAGNOSIS — G47.8 SLEEP RELATED EATING DISORDER: ICD-10-CM

## 2025-09-03 DIAGNOSIS — F41.0 GENERALIZED ANXIETY DISORDER WITH PANIC ATTACKS: ICD-10-CM

## 2025-09-03 LAB
BACTERIAL VAGINOSIS VAG-IMP: NEGATIVE
C TRACH DNA SPEC QL PROBE+SIG AMP: NEGATIVE
CANDIDA DNA VAG QL NAA+PROBE: NOT DETECTED
CANDIDA GLABRATA / CANDIDA KRUSEI DNA: NOT DETECTED
ERYTHROCYTE [DISTWIDTH] IN BLOOD BY AUTOMATED COUNT: 12 % (ref 10–15)
FERRITIN SERPL-MCNC: 127 NG/ML (ref 6–175)
HCG SERPL QL: NEGATIVE
HCT VFR BLD AUTO: 39.7 % (ref 35–47)
HCV AB SERPL QL IA: NONREACTIVE
HGB BLD-MCNC: 13.6 G/DL (ref 11.7–15.7)
HIV 1+2 AB+HIV1 P24 AG SERPL QL IA: NONREACTIVE
MCH RBC QN AUTO: 31.5 PG (ref 26.5–33)
MCHC RBC AUTO-ENTMCNC: 34.3 G/DL (ref 31.5–36.5)
MCV RBC AUTO: 91.9 FL (ref 78–100)
N GONORRHOEA DNA SPEC QL NAA+PROBE: NEGATIVE
PLATELET # BLD AUTO: 304 10E3/UL (ref 150–450)
RBC # BLD AUTO: 4.32 10E6/UL (ref 3.8–5.2)
SPECIMEN TYPE: NORMAL
T PALLIDUM AB SER QL: NONREACTIVE
T VAGINALIS DNA VAG QL NAA+PROBE: NOT DETECTED
T4 FREE SERPL-MCNC: 1.07 NG/DL (ref 0.9–1.7)
TSH SERPL DL<=0.005 MIU/L-ACNC: 2.69 UIU/ML (ref 0.3–4.2)
WBC # BLD AUTO: 6.48 10E3/UL (ref 4–11)

## 2025-09-03 PROCEDURE — 84703 CHORIONIC GONADOTROPIN ASSAY: CPT | Mod: ZL | Performed by: NURSE PRACTITIONER

## 2025-09-03 PROCEDURE — 86780 TREPONEMA PALLIDUM: CPT | Mod: ZL | Performed by: NURSE PRACTITIONER

## 2025-09-03 PROCEDURE — 87491 CHLMYD TRACH DNA AMP PROBE: CPT | Mod: ZL | Performed by: NURSE PRACTITIONER

## 2025-09-03 PROCEDURE — 84443 ASSAY THYROID STIM HORMONE: CPT | Mod: ZL | Performed by: NURSE PRACTITIONER

## 2025-09-03 PROCEDURE — 36415 COLL VENOUS BLD VENIPUNCTURE: CPT | Mod: ZL | Performed by: NURSE PRACTITIONER

## 2025-09-03 PROCEDURE — 81515 NFCT DS BV&VAGINITIS DNA ALG: CPT | Mod: ZL | Performed by: NURSE PRACTITIONER

## 2025-09-03 PROCEDURE — 85014 HEMATOCRIT: CPT | Mod: ZL | Performed by: NURSE PRACTITIONER

## 2025-09-03 PROCEDURE — 87389 HIV-1 AG W/HIV-1&-2 AB AG IA: CPT | Mod: ZL | Performed by: NURSE PRACTITIONER

## 2025-09-03 PROCEDURE — 84439 ASSAY OF FREE THYROXINE: CPT | Mod: ZL | Performed by: NURSE PRACTITIONER

## 2025-09-03 PROCEDURE — 86803 HEPATITIS C AB TEST: CPT | Mod: ZL | Performed by: NURSE PRACTITIONER

## 2025-09-03 PROCEDURE — G0463 HOSPITAL OUTPT CLINIC VISIT: HCPCS

## 2025-09-03 PROCEDURE — 82728 ASSAY OF FERRITIN: CPT | Mod: ZL | Performed by: NURSE PRACTITIONER

## 2025-09-03 RX ORDER — CLONAZEPAM 0.5 MG/1
TABLET ORAL
Qty: 60 TABLET | Refills: 1 | Status: CANCELLED | OUTPATIENT
Start: 2025-09-03

## 2025-09-03 RX ORDER — VALACYCLOVIR HYDROCHLORIDE 500 MG/1
500 TABLET, FILM COATED ORAL DAILY
Qty: 30 TABLET | Refills: 4 | Status: SHIPPED | OUTPATIENT
Start: 2025-09-03

## 2025-09-03 ASSESSMENT — PATIENT HEALTH QUESTIONNAIRE - PHQ9
SUM OF ALL RESPONSES TO PHQ QUESTIONS 1-9: 4
SUM OF ALL RESPONSES TO PHQ QUESTIONS 1-9: 4
10. IF YOU CHECKED OFF ANY PROBLEMS, HOW DIFFICULT HAVE THESE PROBLEMS MADE IT FOR YOU TO DO YOUR WORK, TAKE CARE OF THINGS AT HOME, OR GET ALONG WITH OTHER PEOPLE: NOT DIFFICULT AT ALL

## 2025-09-03 ASSESSMENT — PAIN SCALES - GENERAL: PAINLEVEL_OUTOF10: NO PAIN (0)

## 2025-09-04 RX ORDER — CLONAZEPAM 0.5 MG/1
TABLET ORAL
Qty: 60 TABLET | Refills: 1 | Status: SHIPPED | OUTPATIENT
Start: 2025-09-04